# Patient Record
Sex: FEMALE | Race: WHITE | Employment: FULL TIME | URBAN - METROPOLITAN AREA
[De-identification: names, ages, dates, MRNs, and addresses within clinical notes are randomized per-mention and may not be internally consistent; named-entity substitution may affect disease eponyms.]

---

## 2020-06-18 ENCOUNTER — HOSPITAL ENCOUNTER (OUTPATIENT)
Age: 30
Discharge: HOME | End: 2020-06-18
Payer: COMMERCIAL

## 2020-06-18 LAB — HCG QUANTITATIVE: 336 IU/L

## 2020-06-18 PROCEDURE — 84702 CHORIONIC GONADOTROPIN TEST: CPT

## 2020-06-18 PROCEDURE — 36415 COLL VENOUS BLD VENIPUNCTURE: CPT

## 2020-06-20 ENCOUNTER — HOSPITAL ENCOUNTER (OUTPATIENT)
Age: 30
Discharge: HOME | End: 2020-06-20
Payer: COMMERCIAL

## 2020-06-20 LAB — HCG QUANTITATIVE: 818 IU/L

## 2020-06-20 PROCEDURE — 84702 CHORIONIC GONADOTROPIN TEST: CPT

## 2020-06-20 PROCEDURE — 36415 COLL VENOUS BLD VENIPUNCTURE: CPT

## 2020-06-24 ENCOUNTER — HOSPITAL ENCOUNTER (OUTPATIENT)
Age: 30
Discharge: HOME | End: 2020-06-24
Payer: COMMERCIAL

## 2020-06-24 LAB — HCG QUANTITATIVE: 1987 IU/L

## 2020-06-24 PROCEDURE — 36415 COLL VENOUS BLD VENIPUNCTURE: CPT

## 2020-06-24 PROCEDURE — 84702 CHORIONIC GONADOTROPIN TEST: CPT

## 2020-10-24 ENCOUNTER — HOSPITAL ENCOUNTER (OUTPATIENT)
Dept: PREADMISSION TESTING | Age: 30
Setting detail: SPECIMEN
Discharge: HOME | End: 2020-10-28
Payer: COMMERCIAL

## 2020-10-24 PROCEDURE — U0003 INFECTIOUS AGENT DETECTION BY NUCLEIC ACID (DNA OR RNA); SEVERE ACUTE RESPIRATORY SYNDROME CORONAVIRUS 2 (SARS-COV-2) (CORONAVIRUS DISEASE [COVID-19]), AMPLIFIED PROBE TECHNIQUE, MAKING USE OF HIGH THROUGHPUT TECHNOLOGIES AS DESCRIBED BY CMS-2020-01-R: HCPCS

## 2020-10-25 LAB — SARS-COV-2, NAA: NOT DETECTED

## 2020-10-27 ENCOUNTER — ANESTHESIA EVENT (OUTPATIENT)
Dept: OPERATING ROOM | Age: 30
End: 2020-10-27
Payer: COMMERCIAL

## 2020-10-28 ENCOUNTER — HOSPITAL ENCOUNTER (OUTPATIENT)
Age: 30
Setting detail: OUTPATIENT SURGERY
Discharge: HOME | End: 2020-10-28
Attending: SURGERY | Admitting: SURGERY
Payer: COMMERCIAL

## 2020-10-28 ENCOUNTER — ANESTHESIA (OUTPATIENT)
Dept: OPERATING ROOM | Age: 30
End: 2020-10-28
Payer: COMMERCIAL

## 2020-10-28 VITALS
DIASTOLIC BLOOD PRESSURE: 89 MMHG | TEMPERATURE: 96.8 F | HEIGHT: 62 IN | RESPIRATION RATE: 16 BRPM | OXYGEN SATURATION: 99 % | WEIGHT: 123.75 LBS | BODY MASS INDEX: 22.77 KG/M2 | SYSTOLIC BLOOD PRESSURE: 125 MMHG | HEART RATE: 73 BPM

## 2020-10-28 VITALS — DIASTOLIC BLOOD PRESSURE: 61 MMHG | SYSTOLIC BLOOD PRESSURE: 104 MMHG | OXYGEN SATURATION: 100 %

## 2020-10-28 LAB
CASE NUMBER:: NORMAL
HCG, PREGNANCY URINE (POC): NEGATIVE
SPECIMEN DESCRIPTION: NORMAL

## 2020-10-28 PROCEDURE — 2580000003 HC RX 258: Performed by: SURGERY

## 2020-10-28 PROCEDURE — 7100000000 HC PACU RECOVERY - FIRST 15 MIN: Performed by: SURGERY

## 2020-10-28 PROCEDURE — 3600000012 HC SURGERY LEVEL 2 ADDTL 15MIN: Performed by: SURGERY

## 2020-10-28 PROCEDURE — 6370000000 HC RX 637 (ALT 250 FOR IP): Performed by: ANESTHESIOLOGY

## 2020-10-28 PROCEDURE — 3700000000 HC ANESTHESIA ATTENDED CARE: Performed by: SURGERY

## 2020-10-28 PROCEDURE — 2500000003 HC RX 250 WO HCPCS: Performed by: NURSE ANESTHETIST, CERTIFIED REGISTERED

## 2020-10-28 PROCEDURE — 2580000003 HC RX 258: Performed by: ANESTHESIOLOGY

## 2020-10-28 PROCEDURE — 2709999900 HC NON-CHARGEABLE SUPPLY: Performed by: SURGERY

## 2020-10-28 PROCEDURE — 7100000011 HC PHASE II RECOVERY - ADDTL 15 MIN: Performed by: SURGERY

## 2020-10-28 PROCEDURE — 81025 URINE PREGNANCY TEST: CPT

## 2020-10-28 PROCEDURE — 88305 TISSUE EXAM BY PATHOLOGIST: CPT

## 2020-10-28 PROCEDURE — 88112 CYTOPATH CELL ENHANCE TECH: CPT

## 2020-10-28 PROCEDURE — 6360000002 HC RX W HCPCS: Performed by: NURSE ANESTHETIST, CERTIFIED REGISTERED

## 2020-10-28 PROCEDURE — 6360000002 HC RX W HCPCS: Performed by: SURGERY

## 2020-10-28 PROCEDURE — 7100000001 HC PACU RECOVERY - ADDTL 15 MIN: Performed by: SURGERY

## 2020-10-28 PROCEDURE — 3600000002 HC SURGERY LEVEL 2 BASE: Performed by: SURGERY

## 2020-10-28 PROCEDURE — 7100000010 HC PHASE II RECOVERY - FIRST 15 MIN: Performed by: SURGERY

## 2020-10-28 PROCEDURE — 3700000001 HC ADD 15 MINUTES (ANESTHESIA): Performed by: SURGERY

## 2020-10-28 PROCEDURE — 2500000003 HC RX 250 WO HCPCS: Performed by: SURGERY

## 2020-10-28 RX ORDER — FENTANYL CITRATE 50 UG/ML
25 INJECTION, SOLUTION INTRAMUSCULAR; INTRAVENOUS EVERY 5 MIN PRN
Status: DISCONTINUED | OUTPATIENT
Start: 2020-10-28 | End: 2020-10-28 | Stop reason: HOSPADM

## 2020-10-28 RX ORDER — SUMATRIPTAN 50 MG/1
50 TABLET, FILM COATED ORAL
COMMUNITY

## 2020-10-28 RX ORDER — ONDANSETRON 2 MG/ML
4 INJECTION INTRAMUSCULAR; INTRAVENOUS
Status: DISCONTINUED | OUTPATIENT
Start: 2020-10-28 | End: 2020-10-28 | Stop reason: HOSPADM

## 2020-10-28 RX ORDER — MIDAZOLAM HYDROCHLORIDE 1 MG/ML
INJECTION INTRAMUSCULAR; INTRAVENOUS PRN
Status: DISCONTINUED | OUTPATIENT
Start: 2020-10-28 | End: 2020-10-28 | Stop reason: SDUPTHER

## 2020-10-28 RX ORDER — SODIUM CHLORIDE 0.9 % (FLUSH) 0.9 %
10 SYRINGE (ML) INJECTION EVERY 12 HOURS SCHEDULED
Status: DISCONTINUED | OUTPATIENT
Start: 2020-10-28 | End: 2020-10-28 | Stop reason: HOSPADM

## 2020-10-28 RX ORDER — LIDOCAINE HYDROCHLORIDE 10 MG/ML
1 INJECTION, SOLUTION EPIDURAL; INFILTRATION; INTRACAUDAL; PERINEURAL
Status: DISCONTINUED | OUTPATIENT
Start: 2020-10-28 | End: 2020-10-28 | Stop reason: HOSPADM

## 2020-10-28 RX ORDER — FENTANYL CITRATE 50 UG/ML
50 INJECTION, SOLUTION INTRAMUSCULAR; INTRAVENOUS EVERY 5 MIN PRN
Status: DISCONTINUED | OUTPATIENT
Start: 2020-10-28 | End: 2020-10-28 | Stop reason: HOSPADM

## 2020-10-28 RX ORDER — ONDANSETRON 2 MG/ML
INJECTION INTRAMUSCULAR; INTRAVENOUS PRN
Status: DISCONTINUED | OUTPATIENT
Start: 2020-10-28 | End: 2020-10-28 | Stop reason: SDUPTHER

## 2020-10-28 RX ORDER — SODIUM CHLORIDE 9 MG/ML
INJECTION, SOLUTION INTRAVENOUS CONTINUOUS
Status: DISCONTINUED | OUTPATIENT
Start: 2020-10-29 | End: 2020-10-28

## 2020-10-28 RX ORDER — OXYCODONE HYDROCHLORIDE AND ACETAMINOPHEN 5; 325 MG/1; MG/1
1 TABLET ORAL
Status: COMPLETED | OUTPATIENT
Start: 2020-10-28 | End: 2020-10-28

## 2020-10-28 RX ORDER — DEXAMETHASONE SODIUM PHOSPHATE 10 MG/ML
INJECTION INTRAMUSCULAR; INTRAVENOUS PRN
Status: DISCONTINUED | OUTPATIENT
Start: 2020-10-28 | End: 2020-10-28 | Stop reason: SDUPTHER

## 2020-10-28 RX ORDER — FENTANYL CITRATE 50 UG/ML
INJECTION, SOLUTION INTRAMUSCULAR; INTRAVENOUS PRN
Status: DISCONTINUED | OUTPATIENT
Start: 2020-10-28 | End: 2020-10-28 | Stop reason: SDUPTHER

## 2020-10-28 RX ORDER — LIDOCAINE HYDROCHLORIDE 20 MG/ML
INJECTION, SOLUTION EPIDURAL; INFILTRATION; INTRACAUDAL; PERINEURAL PRN
Status: DISCONTINUED | OUTPATIENT
Start: 2020-10-28 | End: 2020-10-28 | Stop reason: SDUPTHER

## 2020-10-28 RX ORDER — DEXTROAMPHETAMINE SACCHARATE, AMPHETAMINE ASPARTATE MONOHYDRATE, DEXTROAMPHETAMINE SULFATE AND AMPHETAMINE SULFATE 3.75; 3.75; 3.75; 3.75 MG/1; MG/1; MG/1; MG/1
15 CAPSULE, EXTENDED RELEASE ORAL 2 TIMES DAILY
COMMUNITY

## 2020-10-28 RX ORDER — SODIUM CHLORIDE 0.9 % (FLUSH) 0.9 %
10 SYRINGE (ML) INJECTION PRN
Status: DISCONTINUED | OUTPATIENT
Start: 2020-10-28 | End: 2020-10-28 | Stop reason: HOSPADM

## 2020-10-28 RX ORDER — CEFAZOLIN SODIUM 1 G/3ML
INJECTION, POWDER, FOR SOLUTION INTRAMUSCULAR; INTRAVENOUS PRN
Status: DISCONTINUED | OUTPATIENT
Start: 2020-10-28 | End: 2020-10-28 | Stop reason: SDUPTHER

## 2020-10-28 RX ORDER — PROPOFOL 10 MG/ML
INJECTION, EMULSION INTRAVENOUS PRN
Status: DISCONTINUED | OUTPATIENT
Start: 2020-10-28 | End: 2020-10-28 | Stop reason: SDUPTHER

## 2020-10-28 RX ORDER — HYDROCODONE BITARTRATE AND ACETAMINOPHEN 5; 325 MG/1; MG/1
1 TABLET ORAL EVERY 4 HOURS PRN
Qty: 18 TABLET | Refills: 0 | Status: SHIPPED | OUTPATIENT
Start: 2020-10-28 | End: 2020-10-31

## 2020-10-28 RX ORDER — SODIUM CHLORIDE, SODIUM LACTATE, POTASSIUM CHLORIDE, CALCIUM CHLORIDE 600; 310; 30; 20 MG/100ML; MG/100ML; MG/100ML; MG/100ML
INJECTION, SOLUTION INTRAVENOUS CONTINUOUS
Status: DISCONTINUED | OUTPATIENT
Start: 2020-10-28 | End: 2020-10-28 | Stop reason: HOSPADM

## 2020-10-28 RX ADMIN — ONDANSETRON 4 MG: 2 INJECTION, SOLUTION INTRAMUSCULAR; INTRAVENOUS at 10:50

## 2020-10-28 RX ADMIN — LIDOCAINE HYDROCHLORIDE 100 MG: 20 INJECTION, SOLUTION EPIDURAL; INFILTRATION; INTRACAUDAL; PERINEURAL at 10:33

## 2020-10-28 RX ADMIN — OXYCODONE HYDROCHLORIDE AND ACETAMINOPHEN 1 TABLET: 5; 325 TABLET ORAL at 12:20

## 2020-10-28 RX ADMIN — Medication 100 MCG: at 10:33

## 2020-10-28 RX ADMIN — DEXAMETHASONE SODIUM PHOSPHATE 10 MG: 10 INJECTION INTRAMUSCULAR; INTRAVENOUS at 10:50

## 2020-10-28 RX ADMIN — PROPOFOL 200 MG: 10 INJECTION, EMULSION INTRAVENOUS at 10:33

## 2020-10-28 RX ADMIN — SODIUM CHLORIDE, POTASSIUM CHLORIDE, SODIUM LACTATE AND CALCIUM CHLORIDE: 600; 310; 30; 20 INJECTION, SOLUTION INTRAVENOUS at 10:30

## 2020-10-28 RX ADMIN — MIDAZOLAM 2 MG: 1 INJECTION INTRAMUSCULAR; INTRAVENOUS at 10:30

## 2020-10-28 RX ADMIN — CEFAZOLIN 2000 MG: 1 INJECTION, POWDER, FOR SOLUTION INTRAMUSCULAR; INTRAVENOUS at 10:45

## 2020-10-28 ASSESSMENT — PULMONARY FUNCTION TESTS
PIF_VALUE: 12
PIF_VALUE: 1
PIF_VALUE: 10
PIF_VALUE: 10
PIF_VALUE: 12
PIF_VALUE: 10
PIF_VALUE: 12
PIF_VALUE: 14
PIF_VALUE: 12
PIF_VALUE: 22
PIF_VALUE: 12
PIF_VALUE: 12
PIF_VALUE: 16
PIF_VALUE: 12
PIF_VALUE: 12
PIF_VALUE: 10
PIF_VALUE: 3
PIF_VALUE: 12
PIF_VALUE: 12
PIF_VALUE: 10
PIF_VALUE: 13
PIF_VALUE: 10
PIF_VALUE: 12
PIF_VALUE: 12
PIF_VALUE: 10
PIF_VALUE: 13
PIF_VALUE: 10
PIF_VALUE: 18
PIF_VALUE: 10
PIF_VALUE: 10
PIF_VALUE: 12
PIF_VALUE: 12
PIF_VALUE: 10
PIF_VALUE: 1
PIF_VALUE: 12
PIF_VALUE: 10
PIF_VALUE: 12
PIF_VALUE: 13
PIF_VALUE: 8
PIF_VALUE: 11
PIF_VALUE: 10
PIF_VALUE: 2
PIF_VALUE: 1
PIF_VALUE: 10
PIF_VALUE: 9
PIF_VALUE: 10
PIF_VALUE: 13
PIF_VALUE: 13
PIF_VALUE: 2
PIF_VALUE: 2
PIF_VALUE: 12
PIF_VALUE: 10
PIF_VALUE: 26
PIF_VALUE: 12
PIF_VALUE: 12
PIF_VALUE: 1
PIF_VALUE: 1
PIF_VALUE: 18
PIF_VALUE: 10
PIF_VALUE: 13
PIF_VALUE: 12
PIF_VALUE: 10
PIF_VALUE: 12
PIF_VALUE: 13
PIF_VALUE: 12
PIF_VALUE: 10
PIF_VALUE: 12

## 2020-10-28 ASSESSMENT — PAIN SCALES - GENERAL
PAINLEVEL_OUTOF10: 4
PAINLEVEL_OUTOF10: 5
PAINLEVEL_OUTOF10: 4
PAINLEVEL_OUTOF10: 4

## 2020-10-28 ASSESSMENT — PAIN DESCRIPTION - ONSET: ONSET: ON-GOING

## 2020-10-28 ASSESSMENT — PAIN DESCRIPTION - DESCRIPTORS: DESCRIPTORS: ACHING

## 2020-10-28 ASSESSMENT — PAIN DESCRIPTION - LOCATION: LOCATION: BREAST

## 2020-10-28 ASSESSMENT — PAIN DESCRIPTION - FREQUENCY: FREQUENCY: CONTINUOUS

## 2020-10-28 ASSESSMENT — PAIN DESCRIPTION - PAIN TYPE: TYPE: SURGICAL PAIN

## 2020-10-28 ASSESSMENT — PAIN DESCRIPTION - ORIENTATION: ORIENTATION: LEFT

## 2020-10-28 NOTE — ANESTHESIA POSTPROCEDURE EVALUATION
Department of Anesthesiology  Postprocedure Note    Patient: Genene Babinski  MRN: [id number]  [place]: 1990  Date of evaluation: 10/28/2020  Time:  1:42 PM     Procedure Summary     Date:  10/28/20 Room / Location:  [facility] OR 01 / [facility] - INPATIENT    Anesthesia Start:  1030 Anesthesia Stop:  4840    Procedure:  LEFT BREAST MASS WIDE EXCISION W ULTRASOUND BY  (Left Breast) Diagnosis:  (DX LEFT BREAST MASS)    Surgeon:  Wesley Poole MD Responsible Provider:  Yony Trent MD    Anesthesia Type:  general ASA Status:  2          Anesthesia Type: No value filed. Quincy Phase I: Quincy Score: 10    Quincy Phase II: Quincy Score: 10    Last vitals: Reviewed and per EMR flowsheets.        Anesthesia Post Evaluation    Complications: no

## 2020-10-28 NOTE — ANESTHESIA PRE PROCEDURE
tablet 1 tablet  1 tablet Oral Once PRN Joanie Pratt MD        ondansetron [facility]) injection 4 mg  4 mg Intravenous Once PRN Joanie Pratt MD           Allergies: Allergies   Allergen Reactions    Sulfa Antibiotics Nausea And Vomiting and Rash       Problem List:  There is no problem list on file for this patient. Past Medical History:        Diagnosis Date    ADHD     Ectopic pregnancy 2020    Migraines        Past Surgical History:        Procedure Laterality Date    BREAST LUMPECTOMY Right     ECTOPIC PREGNANCY SURGERY  2020    LYMPH NODE DISSECTION Left        Social History:    Social History     Tobacco Use    Smoking status: Current Every Day Smoker     Packs/day: 0.25    Smokeless tobacco: Never Used   Substance Use Topics    Alcohol use: Yes                                Ready to quit: Not Answered  Counseling given: Not Answered      Vital Signs (Current):   Vitals:    10/28/20 0817   BP: 117/74   Pulse: 79   Resp: 16   Temp: 97.2 °F (36.2 °C)   TempSrc: Temporal   SpO2: 99%   Weight: 123 lb 12 oz (56.1 kg)   Height: 5' 2\" (1.575 m)                                              BP Readings from Last 3 Encounters:   10/28/20 117/74       NPO Status: Time of last liquid consumption: 2200                        Time of last solid consumption: 2200                        Date of last liquid consumption: 10/27/20                        Date of last solid food consumption: 10/27/20    BMI:   Wt Readings from Last 3 Encounters:   10/28/20 123 lb 12 oz (56.1 kg)     Body mass index is 22.63 kg/m².     CBC:   Lab Results   Component Value Date    WBC 9.0 12/31/2013    RBC 4.40 12/31/2013    HGB 13.3 12/31/2013    HCT 40.8 12/31/2013    MCV 92.8 12/31/2013    RDW 12.1 12/31/2013     12/31/2013       CMP:   Lab Results   Component Value Date     12/31/2013    K 4.4 12/31/2013     12/31/2013    CO2 26 12/31/2013    BUN 9 12/31/2013    CREATININE 0.63 12/31/2013    GFRAA >60

## 2020-10-28 NOTE — OP NOTE
area that had a very large cyst, but there was no  other area that had some cut in the cavity. All the tissue excised and  was submitted to pathology in formalin. The patient generally tolerated  the procedure well. Hemostasis was achieved using electrocautery and  argon beam coagulation. The wound was closed in layers with 3-0 Vicryl  to subcutaneous tissue and 3-0 Monocryl in the subcuticular layer to  approximate the skin edges. A sterile dressing was then applied to the  breast.  The patient left the operating suite in satisfactory condition.   Blood loss was minimal.      Jey Aguilar    D: 10/28/2020 11:59:04       T: 10/28/2020 12:39:44     [email or web address]  Job#: [id number]     Doc#: [id number]    CC:  Dinora Loepz       _____

## 2020-10-28 NOTE — BRIEF OP NOTE
Brief Postoperative Note      Patient: Aniyah Ovalles  YOB: 1990  MRN: [id number]    Date of Procedure: 10/28/2020    Pre-Op Diagnosis: DX LEFT BREAST MASS    Post-Op Diagnosis: same; left breast cysts       Procedure(s):  LEFT BREAST MASS WIDE EXCISION W ULTRASOUND BY     Surgeon(s):  Tran Maynard MD    Assistant:  Surgical Assistant: Philip Lemus    Anesthesia: General    Estimated Blood Loss (mL): Minimal    Complications: None    Specimens:   ID Type Source Tests Collected by Time Destination   A : left breast mass Tissue Breast [place] [address], MD 10/28/2020 1115    B : cyst aspiration left breast Body Fluid Breast CYTOLOGY, NON-GYN Tran Maynard MD 10/28/2020 1132        Implants:  * No implants in log *      Drains: * No LDAs found *    Findings: complex cyst in left breast at 2:00 and macrocyst at 3:00    Electronically signed by Tran Maynard MD on 10/28/2020 at 11:50 AM

## 2020-10-29 LAB
SURGICAL PATHOLOGY REPORT: NORMAL
SURGICAL PATHOLOGY REPORT: NORMAL

## 2021-08-26 ENCOUNTER — OFFICE VISIT (OUTPATIENT)
Dept: OBGYN CLINIC | Age: 31
End: 2021-08-26
Payer: OTHER GOVERNMENT

## 2021-08-26 VITALS
WEIGHT: 120 LBS | HEIGHT: 62 IN | BODY MASS INDEX: 22.08 KG/M2 | SYSTOLIC BLOOD PRESSURE: 110 MMHG | DIASTOLIC BLOOD PRESSURE: 60 MMHG

## 2021-08-26 DIAGNOSIS — Z87.59 HISTORY OF ECTOPIC PREGNANCY: ICD-10-CM

## 2021-08-26 DIAGNOSIS — Z31.69 ENCOUNTER FOR PRECONCEPTION CONSULTATION: Primary | ICD-10-CM

## 2021-08-26 PROCEDURE — 99203 OFFICE O/P NEW LOW 30 MIN: CPT | Performed by: OBSTETRICS & GYNECOLOGY

## 2021-08-27 NOTE — PROGRESS NOTES
James Rojas  2021    YOB: 1990          The patient was seen today. She is here regarding preconception counseling . Her bowels are regular and she is voiding without difficulty. HPI:  Antonio Prieto is a 36 y.o. female      Menarche: 8  No history of STI or PID, no history of intrabdominal infection  Periods are not overly painful and they are very regular    History of ectopic pregnancy in  with R salpingectomy  She had HSG that showed open/patent L fallopian tube    She is using ovulation test strips and states that they are positive and she is tracking appropriately. She states her and  Erickson have been trying actively for 6 months. They have not completed a semen analysis but she has done clomid cycles x4 with another provider. States no testing of progesterone or labs. She states that she is up to date on pap. Has had ultrasound in last 1 month. No signs of septum, polyp, or fibroids on ultrasound      OB History    Para Term  AB Living   1 0 0 0 1 0   SAB TAB Ectopic Molar Multiple Live Births   0 0 1 0 0 0      # Outcome Date GA Lbr Slava/2nd Weight Sex Delivery Anes PTL Lv   1 Ectopic 2020               Past Medical History:   Diagnosis Date    ADHD     Ectopic pregnancy     Migraines        Past Surgical History:   Procedure Laterality Date    BREAST LUMPECTOMY Right     BREAST SURGERY Left 10/28/2020    LEFT BREAST MASS WIDE EXCISION W ULTRASOUND BY  performed by Juliana Govea MD at [address] [place] [address]      right tube removed    LYMPH NODE DISSECTION Left        History reviewed. No pertinent family history.     Social History     Socioeconomic History    Marital status: Single     Spouse name: Not on file    Number of children: Not on file    Years of education: Not on file    Highest education level: Not on file   Occupational History    Not on file   Tobacco Use    Smoking status: Current Every Day Smoker     Packs/day: 0.25    Smokeless tobacco: Never Used   Vaping Use    Vaping Use: Never used   Substance and Sexual Activity    Alcohol use: Yes    Drug use: Not Currently    Sexual activity: Yes     Partners: Male   Other Topics Concern    Not on file   Social History Narrative    Not on file     Social Determinants of Health     Financial Resource Strain:     Difficulty of Paying Living Expenses:    Food Insecurity:     Worried About Running Out of Food in the Last Year:     [address] in the Last Year:    Transportation Needs:     Lack of Transportation (Medical):  Lack of Transportation (Non-Medical):    Physical Activity:     Days of Exercise per Week:     Minutes of Exercise per Session:    Stress:     Feeling of Stress :    Social Connections:     Frequency of Communication with Friends and Family:     Frequency of Social Gatherings with Friends and Family:     Attends Taoism Services:     Active Member of Clubs or Organizations:     Attends Club or Organization Meetings:     Marital Status:    Intimate Partner Violence:     Fear of Current or Ex-Partner:     Emotionally Abused:     Physically Abused:     Sexually Abused:          MEDICATIONS:  Current Outpatient Medications   Medication Sig Dispense Refill    amphetamine-dextroamphetamine (ADDERALL XR) 15 MG extended release capsule Take 15 mg by mouth 2 times daily.  SUMAtriptan (IMITREX) 50 MG tablet Take 50 mg by mouth once as needed for Migraine       No current facility-administered medications for this visit. ALLERGIES:  Allergies as of 08/26/2021 - Fully Reviewed 08/26/2021   Allergen Reaction Noted    Sulfa antibiotics Nausea And Vomiting and Rash 10/28/2020         REVIEW OF SYSTEMS:       A minimum of an eleven point review of systems was completed. Review Of Systems (11 point):  Constitutional: No fever, chills or malaise;  No weight change or fatigue  Head and Eyes: No vision, Headache, Dizziness or trauma in last 12 months  ENT ROS: No hearing, Tinnitis, sinus or taste problems  Hematological and Lymphatic ROS:No Lymphoma, Von Willebrand's, Hemophillia or Bleeding History  Psych ROS: No Depression, Homicidal thoughts,suicidal thoughts, or anxiety  Breast ROS: No prior breast abnormalities or lumps  Respiratory ROS: No SOB, Pneumoniae,Cough, or Pulmonary Embolism History  Cardiovascular ROS: No Chest Pain with Exertion, Palpitations, Syncope, Edema, Arrhythmia  Gastrointestinal ROS: No Indigestion, Heartburn, Nausea, vomiting, Diarrhea, Constipation,or Bowel Changes; No Bloody Stools or melena  Genito-Urinary ROS: No Dysuria, Hematuria or Nocturia. No Urinary Incontinence or Vaginal Discharge  Musculoskeletal ROS: No Arthralgia, Arthritis,Gout,Osteoporosis or Rheumatism  Neurological ROS: No CVA, Migraines, Epilepsy, Seizure Hx, or Limb Weakness  Dermatological ROS: No Rash, Itching, Hives, Mole Changes or Cancer          Blood pressure 110/60, height 5' 2\" (1.575 m), weight 120 lb (54.4 kg), last menstrual period 08/06/2021. Abdomen: Soft non-tender; good bowel sounds. No guarding, rebound or rigidity. No CVA tenderness bilaterally. Extremities: No calf tenderness, DTR 2/4, and No edema bilaterally    Pelvic: Exam deferred. Diagnostics:  No results found. Lab Results:  Results for orders placed or performed during the hospital encounter of 10/24/20   Covid-19 Ambulatory   Result Value Ref Range    SARS-CoV-2, DENISE Not Detected Not Detected         Assessment:   Diagnosis Orders   1. Encounter for preconception consultation  Progesterone    TSH With Reflex Ft4   2. History of ectopic pregnancy  Progesterone    TSH With Reflex Ft4     There are no problems to display for this patient. PLAN:  Return in about 4 months (around 12/26/2021) for follow up preconception.   [place] day 12,14,16,18  [place]: coenzyme q10 and Fish oil  PNV and folic acid  Semen analysis for [facility]  Repeat Annual every 1 year  Cervical Cytology Evaluation begins at 26years old. If Negative Cytology, Follow-up screening per current guidelines. Return to the office in 3-4 months. Counseled on preventative health maintenance follow-up. Orders Placed This Encounter   Procedures    Progesterone     Standing Status:   Future     Standing Expiration Date:   8/26/2022    TSH With Reflex Ft4     Standing Status:   Future     Standing Expiration Date:   8/26/2022           The patient, Gary Wiley is a 36 y.o. female, was seen with a total time spent of 30 minutes for the visit on this date of service by the E/M provider. The time component had both face to face and non face to face time spent in determining the total time component. Counseling and education regarding her diagnosis listed below and her options regarding those diagnoses were also included in determining her time component. Diagnosis Orders   1. Encounter for preconception consultation  Progesterone    TSH With Reflex Ft4   2. History of ectopic pregnancy  Progesterone    TSH With Reflex Ft4        The patient had her preventative health maintenance recommendations and follow-up reviewed with her at the completion of her visit.

## 2021-09-24 ENCOUNTER — HOSPITAL ENCOUNTER (OUTPATIENT)
Age: 31
Discharge: HOME | End: 2021-09-24
Payer: OTHER GOVERNMENT

## 2021-09-24 DIAGNOSIS — Z31.69 ENCOUNTER FOR PRECONCEPTION CONSULTATION: ICD-10-CM

## 2021-09-24 DIAGNOSIS — Z87.59 HISTORY OF ECTOPIC PREGNANCY: ICD-10-CM

## 2021-09-24 LAB
PROGESTERONE LEVEL: 11.09 NG/ML
TSH SERPL DL<=0.05 MIU/L-ACNC: 3.49 MIU/L (ref 0.3–5)

## 2021-09-24 PROCEDURE — 36415 COLL VENOUS BLD VENIPUNCTURE: CPT

## 2021-09-24 PROCEDURE — 84144 ASSAY OF PROGESTERONE: CPT

## 2021-09-24 PROCEDURE — 84443 ASSAY THYROID STIM HORMONE: CPT

## 2021-09-27 ENCOUNTER — TELEPHONE (OUTPATIENT)
Dept: OBGYN CLINIC | Age: 31
End: 2021-09-27

## 2021-09-27 DIAGNOSIS — R79.89 ELEVATED TSH: Primary | ICD-10-CM

## 2021-09-30 RX ORDER — LEVOTHYROXINE SODIUM 0.05 MG/1
50 TABLET ORAL DAILY
Qty: 30 TABLET | Refills: 3 | Status: SHIPPED | OUTPATIENT
Start: 2021-09-30

## 2021-10-04 ENCOUNTER — HOSPITAL ENCOUNTER (OUTPATIENT)
Age: 31
Discharge: HOME | End: 2021-10-04
Payer: OTHER GOVERNMENT

## 2021-10-04 DIAGNOSIS — O09.10 PREGNANCY WITH HISTORY OF ECTOPIC PREGNANCY, ANTEPARTUM: Primary | ICD-10-CM

## 2021-10-04 DIAGNOSIS — O09.10 PREGNANCY WITH HISTORY OF ECTOPIC PREGNANCY, ANTEPARTUM: ICD-10-CM

## 2021-10-04 LAB — HCG QUANTITATIVE: 152 IU/L

## 2021-10-04 PROCEDURE — 36415 COLL VENOUS BLD VENIPUNCTURE: CPT

## 2021-10-04 PROCEDURE — 84702 CHORIONIC GONADOTROPIN TEST: CPT

## 2021-10-05 ENCOUNTER — PATIENT MESSAGE (OUTPATIENT)
Dept: OBGYN CLINIC | Age: 31
End: 2021-10-05

## 2021-10-05 DIAGNOSIS — Z34.90 EARLY STAGE OF PREGNANCY: Primary | ICD-10-CM

## 2021-10-05 NOTE — TELEPHONE ENCOUNTER
From: Robinson Coronado  To: Ricardo Ruiz DO  Sent: 10/5/2021 7:48 AM EDT  Subject: Test Results Question    I was wondering if my HCG was normal? Or what I needed to do-if anything. I have not had any one sided pain so far. Thank you!

## 2021-10-05 NOTE — TELEPHONE ENCOUNTER
Yes just repeat as discussed in 3 days and recommend continue to trend given hx of ectopic and can plan to have her come in once over 2000 to verify IUP. If continue to elevate appropriate get set up for IPV. Continue to monitor ectopic precautions.

## 2021-10-07 ENCOUNTER — HOSPITAL ENCOUNTER (OUTPATIENT)
Age: 31
Discharge: HOME | End: 2021-10-07
Payer: OTHER GOVERNMENT

## 2021-10-07 DIAGNOSIS — Z34.90 EARLY STAGE OF PREGNANCY: ICD-10-CM

## 2021-10-07 LAB — HCG QUANTITATIVE: 967 IU/L

## 2021-10-07 PROCEDURE — 36415 COLL VENOUS BLD VENIPUNCTURE: CPT

## 2021-10-07 PROCEDURE — 84702 CHORIONIC GONADOTROPIN TEST: CPT

## 2021-10-09 ENCOUNTER — HOSPITAL ENCOUNTER (EMERGENCY)
Age: 31
Discharge: HOME | End: 2021-10-09
Attending: EMERGENCY MEDICINE
Payer: OTHER GOVERNMENT

## 2021-10-09 ENCOUNTER — APPOINTMENT (OUTPATIENT)
Dept: ULTRASOUND IMAGING | Age: 31
End: 2021-10-09
Payer: OTHER GOVERNMENT

## 2021-10-09 VITALS
HEART RATE: 87 BPM | SYSTOLIC BLOOD PRESSURE: 114 MMHG | RESPIRATION RATE: 18 BRPM | DIASTOLIC BLOOD PRESSURE: 75 MMHG | OXYGEN SATURATION: 99 % | TEMPERATURE: 98.1 F

## 2021-10-09 DIAGNOSIS — O20.0 THREATENED ABORTION, ANTEPARTUM: ICD-10-CM

## 2021-10-09 DIAGNOSIS — O20.9 VAGINAL BLEEDING BEFORE 22 WEEKS GESTATION: Primary | ICD-10-CM

## 2021-10-09 PROBLEM — O36.80X0 PREGNANCY OF UNKNOWN ANATOMIC LOCATION: Status: ACTIVE | Noted: 2021-10-09

## 2021-10-09 PROBLEM — Z87.59 HISTORY OF ECTOPIC PREGNANCY: Status: ACTIVE | Noted: 2021-10-09

## 2021-10-09 LAB
-: NORMAL
ABSOLUTE EOS #: 0.23 K/UL (ref 0–0.44)
ABSOLUTE IMMATURE GRANULOCYTE: <0.03 K/UL (ref 0–0.3)
ABSOLUTE LYMPH #: 2.81 K/UL (ref 1.1–3.7)
ABSOLUTE MONO #: 0.63 K/UL (ref 0.1–1.2)
AMORPHOUS: NORMAL
ANION GAP SERPL CALCULATED.3IONS-SCNC: 12 MMOL/L (ref 9–17)
BACTERIA: NORMAL
BASOPHILS # BLD: 1 % (ref 0–2)
BASOPHILS ABSOLUTE: 0.05 K/UL (ref 0–0.2)
BILIRUBIN URINE: NEGATIVE
BUN BLDV-MCNC: 6 MG/DL (ref 6–20)
BUN/CREAT BLD: ABNORMAL (ref 9–20)
CALCIUM SERPL-MCNC: 9.2 MG/DL (ref 8.6–10.4)
CASTS UA: NORMAL /LPF (ref 0–8)
CHLORIDE BLD-SCNC: 103 MMOL/L (ref 98–107)
CO2: 19 MMOL/L (ref 20–31)
COLOR: YELLOW
COMMENT UA: ABNORMAL
CREAT SERPL-MCNC: 0.46 MG/DL (ref 0.5–0.9)
CRYSTALS, UA: NORMAL /HPF
DIFFERENTIAL TYPE: NORMAL
EOSINOPHILS RELATIVE PERCENT: 3 % (ref 1–4)
EPITHELIAL CELLS UA: NORMAL /HPF (ref 0–5)
GFR AFRICAN AMERICAN: >60 ML/MIN
GFR NON-AFRICAN AMERICAN: >60 ML/MIN
GFR SERPL CREATININE-BSD FRML MDRD: ABNORMAL ML/MIN/{1.73_M2}
GFR SERPL CREATININE-BSD FRML MDRD: ABNORMAL ML/MIN/{1.73_M2}
GLUCOSE BLD-MCNC: 90 MG/DL (ref 70–99)
GLUCOSE URINE: NEGATIVE
HCG QUANTITATIVE: 2135 IU/L
HCT VFR BLD CALC: 39.2 % (ref 36.3–47.1)
HEMOGLOBIN: 12.6 G/DL (ref 11.9–15.1)
IMMATURE GRANULOCYTES: 0 %
KETONES, URINE: NEGATIVE
LEUKOCYTE ESTERASE, URINE: NEGATIVE
LYMPHOCYTES # BLD: 38 % (ref 24–43)
MCH RBC QN AUTO: 29.8 PG (ref 25.2–33.5)
MCHC RBC AUTO-ENTMCNC: 32.1 G/DL (ref 28.4–34.8)
MCV RBC AUTO: 92.7 FL (ref 82.6–102.9)
MONOCYTES # BLD: 9 % (ref 3–12)
MUCUS: NORMAL
NITRITE, URINE: NEGATIVE
NRBC AUTOMATED: 0 PER 100 WBC
OTHER OBSERVATIONS UA: NORMAL
PDW BLD-RTO: 12.2 % (ref 11.8–14.4)
PH UA: 8 (ref 5–8)
PLATELET # BLD: 273 K/UL (ref 138–453)
PLATELET ESTIMATE: NORMAL
PMV BLD AUTO: 10.4 FL (ref 8.1–13.5)
POTASSIUM SERPL-SCNC: 4.1 MMOL/L (ref 3.7–5.3)
PROTEIN UA: NEGATIVE
RBC # BLD: 4.23 M/UL (ref 3.95–5.11)
RBC # BLD: NORMAL 10*6/UL
RBC UA: NORMAL /HPF (ref 0–4)
RENAL EPITHELIAL, UA: NORMAL /HPF
SEG NEUTROPHILS: 49 % (ref 36–65)
SEGMENTED NEUTROPHILS ABSOLUTE COUNT: 3.69 K/UL (ref 1.5–8.1)
SODIUM BLD-SCNC: 134 MMOL/L (ref 135–144)
SPECIFIC GRAVITY UA: 1.01 (ref 1–1.03)
TRICHOMONAS: NORMAL
TURBIDITY: CLEAR
URINE HGB: ABNORMAL
UROBILINOGEN, URINE: NORMAL
WBC # BLD: 7.4 K/UL (ref 3.5–11.3)
WBC # BLD: NORMAL 10*3/UL
WBC UA: NORMAL /HPF (ref 0–5)
YEAST: NORMAL

## 2021-10-09 PROCEDURE — 85025 COMPLETE CBC W/AUTO DIFF WBC: CPT

## 2021-10-09 PROCEDURE — 99283 EMERGENCY DEPT VISIT LOW MDM: CPT

## 2021-10-09 PROCEDURE — 86900 BLOOD TYPING SEROLOGIC ABO: CPT

## 2021-10-09 PROCEDURE — 81001 URINALYSIS AUTO W/SCOPE: CPT

## 2021-10-09 PROCEDURE — 76817 TRANSVAGINAL US OBSTETRIC: CPT

## 2021-10-09 PROCEDURE — 80048 BASIC METABOLIC PNL TOTAL CA: CPT

## 2021-10-09 PROCEDURE — 84702 CHORIONIC GONADOTROPIN TEST: CPT

## 2021-10-09 PROCEDURE — 86901 BLOOD TYPING SEROLOGIC RH(D): CPT

## 2021-10-09 PROCEDURE — 86850 RBC ANTIBODY SCREEN: CPT

## 2021-10-09 ASSESSMENT — ENCOUNTER SYMPTOMS
ABDOMINAL PAIN: 0
COUGH: 0
VOMITING: 0
BACK PAIN: 0
SHORTNESS OF BREATH: 0
NAUSEA: 0

## 2021-10-09 NOTE — CONSULTS
[address] [facility]    Patient Name: Renetta Padilla     Patient : 1990  Room/Bed:   Admission Date/Time: 10/9/2021  2:15 PM  Primary Care Physician: Lorenzo Palmer MD    Consulting Provider: Dr. Fabyb Xiao  Reason for Consult: Vaginal Bleeding    CC:   Chief Complaint   Patient presents with    Vaginal Bleeding     ectopic pregnancy 5 weeks                HPI: Renetta Padilla is a 36 y.o. female Marley Nance presents from home with vaginal bleeding. She estimates that she is about 5 weeks pregnant but has not had a formal ultrasound at this time. She has a history of a ruptured ectopic pregnancy about 1 year ago. She states that she started to have bleeding and mild cramping about 24 hours ago. Pelvic Exam performed by the ED showed a slow trickle of bleeding from the cervical os. TVUS suggestive of miscarriage. No LMP recorded.      REVIEW OF SYSTEMS:   Constitutional: negative fever, negative chills, negative weight changes   HEENT: negative visual disturbances, negative headaches, negative dizziness, negative hearing loss  Breast: Negative breast abnormalities, negative breast lumps, negative nipple discharge  Respiratory: negative dyspnea, negative cough, negative SOB  Cardiovascular: negative chest pain,  negative palpitations, negative arrhythmia, negative syncope   Gastrointestinal: negative abdominal pain, negative RUQ pain, negative N/V, negative diarrhea, negative constipation, negative bowel changes, negative heartburn   Genitourinary: negative dysuria, negative hematuria, negative urinary incontinence, negative vaginal discharge, positive vaginal bleeding or spotting  Dermatological: negative rash, negative pruritis, negative mole or other skin changes  Hematologic: negative bruising  Immunologic/Lymphatic: negative recent illness, negative recent sick contact  Musculoskeletal: negative back pain, negative myalgias, negative arthralgias  Neurological:  negative dizziness, negative migraines, negative seizures, negative weakness  Behavior/Psych: negative depression, negative anxiety, negative SI, negative HI    ____________________________________________________________    OBSTETRICAL HISTORY:   OB History    Para Term  AB Living   1 0 0 0 1 0   SAB TAB Ectopic Molar Multiple Live Births   0 0 1 0 0 0      # Outcome Date GA Lbr Slava/2nd Weight Sex Delivery Anes PTL Lv   1 Ectopic 2020              Birth Comments: Right salpingectomy       PAST MEDICAL HISTORY:   has a past medical history of ADHD, Ectopic pregnancy, and Migraines. PAST SURGICAL HISTORY:   has a past surgical history that includes lymph node dissection (Left); Ectopic pregnancy surgery (2020); Breast lumpectomy (Right); and Breast surgery (Left, 10/28/2020). ALLERGIES:  Allergies as of 10/09/2021 - Fully Reviewed 10/09/2021   Allergen Reaction Noted    Sulfa antibiotics Nausea And Vomiting and Rash 10/28/2020       MEDICATIONS:  No current facility-administered medications for this encounter. Current Outpatient Medications   Medication Sig Dispense Refill    levothyroxine (SYNTHROID) 50 MCG tablet Take 1 tablet by mouth Daily 30 tablet 3    amphetamine-dextroamphetamine (ADDERALL XR) 15 MG extended release capsule Take 15 mg by mouth 2 times daily.  SUMAtriptan (IMITREX) 50 MG tablet Take 50 mg by mouth once as needed for Migraine         FAMILY HISTORY:  Family History of Breast, Ovarian, Colon or Uterine Cancer: No   family history is not on file. SOCIAL HISTORY:   reports that she has been smoking. She has been smoking about 0.25 packs per day. She has never used smokeless tobacco. She reports current alcohol use.  She reports previous drug use.  ________________________________________________________________________                                    Leni Backers:  Vitals:    10/09/21 1649   BP: 114/75   Pulse: 87   Resp: 18   Temp: 98.1 °F (36.7 °C)   TempSrc: Oral SpO2: 99%                                              INPUT/OUTPUT:  No intake/output data recorded. No intake/output data recorded. PHYSICAL EXAM:     General Appearance: Appears healthy. Alert; in no acute distress. Pleasant. Skin: Skin color, texture, turgor normal. No rashes or lesions. Lymphatic: No abnormally enlarged lymph nodes. Neck and EENT: normal atraumatic, no neck masses, normal thyroid, no jvd  Respiratory: Normal expansion. Clear to auscultation. No rales, rhonchi, or wheezing.   Cardiovascular: normal, regular rate and rhythm, no murmurs, rubs, or gallops  Breast: deferred  Abdomen: soft, non-tender, non-distended, no right upper quadrant tenderness and no CVA tenderness, no rebound, guarding, or rigidity, no abdominal scars  Pelvic Exam: deferred  Rectal Exam: deferred  Musculoskeletal: no gross abnormalities  Extremities: non-tender BLE and non-edematous  Psych:  oriented to time, place and person     LAB RESULTS:  Results for orders placed or performed during the hospital encounter of 10/09/21   CBC Auto Differential   Result Value Ref Range    WBC 7.4 3.5 - 11.3 k/uL    RBC 4.23 3.95 - 5.11 m/uL    Hemoglobin 12.6 11.9 - 15.1 g/dL    Hematocrit 39.2 36.3 - 47.1 %    MCV 92.7 82.6 - 102.9 fL    MCH 29.8 25.2 - 33.5 pg    MCHC 32.1 28.4 - 34.8 g/dL    RDW 12.2 11.8 - 14.4 %    Platelets 812 795 - 255 k/uL    MPV 10.4 8.1 - 13.5 fL    NRBC Automated 0.0 0.0 per 100 WBC    Differential Type NOT REPORTED     Seg Neutrophils 49 36 - 65 %    Lymphocytes 38 24 - 43 %    Monocytes 9 3 - 12 %    Eosinophils % 3 1 - 4 %    Basophils 1 0 - 2 %    Immature Granulocytes 0 0 %    Segs Absolute 3.69 1.50 - 8.10 k/uL    Absolute Lymph # 2.81 1.10 - 3.70 k/uL    Absolute Mono # 0.63 0.10 - 1.20 k/uL    Absolute Eos # 0.23 0.00 - 0.44 k/uL    Basophils Absolute 0.05 0.00 - 0.20 k/uL    Absolute Immature Granulocyte <0.03 0.00 - 0.30 k/uL    WBC Morphology NOT REPORTED     RBC Morphology NOT REPORTED     Platelet Estimate NOT REPORTED          DIAGNOSTICS:  Narrative   EXAMINATION:   FIRST TRIMESTER OBSTETRIC ULTRASOUND       10/9/2021       TECHNIQUE:   Transvaginal first trimester obstetric pelvic ultrasound was performed with   color Doppler flow evaluation.       COMPARISON:   None       HISTORY:   ORDERING SYSTEM PROVIDED HISTORY: Hx of ruptured ectopic.  vaginal bleeding.   ~5w pregnant   TECHNOLOGIST PROVIDED HISTORY:   Hx of ruptured ectopic.  vaginal bleeding. ~5w pregnant       FINDINGS:   Uterus: The gravid uterus is unremarkable measuring 7.8 x 5.1 x 5.8 cm. However, heterogeneous fluid is present within the endometrial canal due to   hemorrhage.  The endometrial echo complex has a maximal thickness of 1.3 cm.       Gestational Sac(s):  Not visualized       Yolk Sac:  Not visualized       Fetal Pole:  Not visualized       Crown Rump Length:  Not measured       Fetal Heart Rate:  Not measured       Right ovary: 3.5 x 2.5 x 2.8 cm.  There are right ovarian follicles.       Left ovary: 3.3 x 1.7 x 1.5 cm.  There are left ovarian follicles.  In   addition, there is a 9 x 10 mm complex cyst favoring a corpus luteal cyst   rather than an ectopic pregnancy.       Free fluid: A trace amount of fluid is present in the cul de sac.           Impression   1. No evidence of an intrauterine gestation with heterogeneous fluid in the   endometrial canal likely due to hemorrhage.  Recommend serial beta HCG and   pelvic ultrasound in 1 week to confirm resolution.    2. 1.0 cm complex left ovarian lesion favoring a corpus luteal cyst rather   than an ectopic pregnancy.             ASSESSMENT & PLAN:    Camille Richmond is a 36 y.o. female  Vaginal bleeding   - Patient presents from home with vaginal bleeding and some cramping for the last 24 hours   - Pelvic Exam performed by ED resident showed slow trickle from the cervical os   - Quant 2135 more than doubled from 48 hours ago when Jacqueline Benoit was 967   - TVUS showed evidence of a miscarriage with heterogenous fluid in the endometrial canal and no signs of viable BHcg   - Abdominal exam is benign   - Patient is stable for discharge at this time with close outpatient follow up   - Will trend her BHCG outpatient to ensure that it goes to 0   - Patient given bleeding and pain precautions and advised that her bleeding and pain will likely increased    - Motrin/tylenol and heat recommended    - Patient will follow up with Dr. Sherif Hernandez discussed with Dr. Karina Jones, who is agreeable.      Attending's Name: Dr. Patience Rosario DO  Ob/Gyn Resident   [address]  10/9/2021, 3:16 PM

## 2021-10-09 NOTE — ED PROVIDER NOTES
[facility] [facility]  Emergency Department  Emergency Medicine Resident Sign-out     Care of Jake Wade was assumed from Dr. Bud Burk and is being seen for Vaginal Bleeding (ectopic pregnancy 5 weeks)  . The patient's initial evaluation and plan have been discussed with the prior provider who initially evaluated the patient. EMERGENCY DEPARTMENT COURSE / MEDICAL DECISION MAKING:       MEDICATIONS GIVEN:  No orders of the defined types were placed in this encounter. LABS / RADIOLOGY:     Labs Reviewed   BASIC METABOLIC PANEL W/ REFLEX TO MG FOR LOW K - Abnormal; Notable for the following components:       Result Value    CREATININE 0.46 (*)     Sodium 134 (*)     CO2 19 (*)     All other components within normal limits   URINE RT REFLEX TO CULTURE - Abnormal; Notable for the following components:    Urine Hgb MODERATE (*)     All other components within normal limits   HCG, QUANTITATIVE, PREGNANCY - Abnormal; Notable for the following components:    hCG Quant 2,135 (*)     All other components within normal limits   CBC WITH AUTO DIFFERENTIAL   MICROSCOPIC URINALYSIS   TYPE AND SCREEN       US OB TRANSVAGINAL    Result Date: 10/9/2021  EXAMINATION: FIRST TRIMESTER OBSTETRIC ULTRASOUND 10/9/2021 TECHNIQUE: Transvaginal first trimester obstetric pelvic ultrasound was performed with color Doppler flow evaluation. COMPARISON: None HISTORY: ORDERING SYSTEM PROVIDED HISTORY: Hx of ruptured ectopic.  vaginal bleeding. ~5w pregnant TECHNOLOGIST PROVIDED HISTORY: Hx of ruptured ectopic.  vaginal bleeding. ~5w pregnant FINDINGS: Uterus: The gravid uterus is unremarkable measuring 7.8 x 5.1 x 5.8 cm. However, heterogeneous fluid is present within the endometrial canal due to hemorrhage. The endometrial echo complex has a maximal thickness of 1.3 cm.  Gestational Sac(s):  Not visualized Yolk Sac:  Not visualized Fetal Pole:  Not visualized Crown Rump Length:  Not measured Fetal Heart Rate:  Not measured Right ovary: 3.5 x 2.5 x 2.8 cm. There are right ovarian follicles. Left ovary: 3.3 x 1.7 x 1.5 cm. There are left ovarian follicles. In addition, there is a 9 x 10 mm complex cyst favoring a corpus luteal cyst rather than an ectopic pregnancy. Free fluid: A trace amount of fluid is present in the cul de sac. 1. No evidence of an intrauterine gestation with heterogeneous fluid in the endometrial canal likely due to hemorrhage. Recommend serial beta HCG and pelvic ultrasound in 1 week to confirm resolution. 2. 1.0 cm complex left ovarian lesion favoring a corpus luteal cyst rather than an ectopic pregnancy. RECENT VITALS:     Temp: 98.1 °F (36.7 °C),  Pulse: 87, Resp: 18, BP: 114/75, SpO2: 99 %    This patient is a 36 y.o. Female with with a past medical history includes ectopic pregnancy approximately a year ago. Positive pregnancy tests. Having vaginal bleeding lower abdominal cramping for the past 24 hours. Sent in by [facility] team for evaluation and transvaginal ultrasound. hCG 2135. Vitals otherwise within normal limits. Stable hemoglobin. Transvaginal ultrasound demonstrating no evidence of intrauterine gestation with heterogeneous fluid in the endometrial canal likely due to hemorrhage. 1.0 cm complex left ovarian lesion favoring corpus luteal cyst rather than ectopic. After further discussion with OB team they do not feel this is an ectopic pregnancy and patient be safely be discharged home and follow-up with OB/GYN team as outpatient. Likely spontaneous miscarriage. Will need hCG trending to 0 as outpatient. Given strict return precautions by OB/GYN team.      OUTSTANDING TASKS / RECOMMENDATIONS:    1. Follow-up transvaginal ultrasound  2. Disposition     FINAL IMPRESSION:     1. Vaginal bleeding before 22 weeks gestation    2.  Threatened , antepartum        DISPOSITION:         DISPOSITION:  [x]  Discharge   []  Transfer -    []  Admission -     []  Against Medical Advice []  Eloped   FOLLOW-UP: Jennifer Vega MD  [address] [facility]  [phone]      As needed    Laura Romero, [facility] 270 [id number] [address]  [phone]    Schedule an appointment as soon as possible for a visit        DISCHARGE MEDICATIONS: New Prescriptions    No medications on file           Gayle Aden DO  Emergency Medicine Resident  [address] [facility]       Gayle Aden, [address] [place] [address]  Resident  10/09/21 8090

## 2021-10-09 NOTE — ED PROVIDER NOTES
[address] [facility]     Emergency Department     Faculty Attestation    I performed a history and physical examination of the patient and discussed management with the resident. I reviewed the residents note and agree with the documented findings and plan of care. Any areas of disagreement are noted on the chart. I was personally present for the key portions of any procedures. I have documented in the chart those procedures where I was not present during the key portions. I have reviewed the emergency nurses triage note. I agree with the chief complaint, past medical history, past surgical history, allergies, medications, social and family history as documented unless otherwise noted below. For Physician Assistant/ Nurse Practitioner cases/documentation I have personally evaluated this patient and have completed at least one if not all key elements of the E/M (history, physical exam, and MDM). Additional findings are as noted. I have personally seen and evaluated the patient. I find the patient's history and physical exam are consistent with the NP/PA documentation. I agree with the care provided, treatment rendered, disposition and follow-up plan. Vaginal bleeding approximately 5 weeks pregnant denies abdominal pain at the present time. Critical Care     Queenie Morales M.D. 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   She has been advised of these findings and she is asking adamantly to be released at this time while we are waiting to formally discuss this with OB/GYN          John Almanza MD  10/09/21 0837 Edmond Snow MD  10/09/21 [address], MD  10/09/21 4199

## 2021-10-09 NOTE — ED PROVIDER NOTES
[facility]  ED  Emergency Department Encounter  EmergencyMedicine Resident     Pt Jude Guillaume  MRN: [id number]  [place] 1990  Date of evaluation: 10/9/21  PCP:  Bob Gaines MD    [address] [place] [address]       Chief Complaint   Patient presents with    Vaginal Bleeding     ectopic pregnancy 5 weeks       HISTORY OF PRESENT ILLNESS  (Location/Symptom, Timing/Onset, Context/Setting, Quality, Duration, Modifying Factors, Severity.)      Clovis Huffman is a 36 y.o. female who presents with painless vaginal bleeding. Patient is approximately 5 weeks pregnant based off quantitative hCGs. Follows with Dr. Esme Cabezas. Patient has a history of ruptured ectopic pregnancy approximately 1 year ago. She states at that time she did not have any pain until her tube ruptured. She has not had a formal transvaginal ultrasound, she is following with Dr. Judie eugene and when she started having vaginal bleeding and cramping approximately 24 hours ago Dr. Esme Cabezas sent her here to rule out ectopic pregnancy. She reports that the bleeding is more than spotting but is not \"gushing\". History of ADHD is on Adderall but has not been taking it, also on migraine medication but has not been taking it during the pregnancy, currently only on Synthroid. Monogamous with . No STI concerns. PAST MEDICAL / SURGICAL / SOCIAL / FAMILY HISTORY      has a past medical history of ADHD, Ectopic pregnancy, and Migraines. has a past surgical history that includes lymph node dissection (Left); Ectopic pregnancy surgery (2020); Breast lumpectomy (Right); and Breast surgery (Left, 10/28/2020).     Social History     Socioeconomic History    Marital status: Single     Spouse name: Not on file    Number of children: Not on file    Years of education: Not on file    Highest education level: Not on file   Occupational History    Not on file   Tobacco Use    Smoking status: Current Every Day Smoker Packs/day: 0.25    Smokeless tobacco: Never Used   Vaping Use    Vaping Use: Never used   Substance and Sexual Activity    Alcohol use: Yes    Drug use: Not Currently    Sexual activity: Yes     Partners: Male   Other Topics Concern    Not on file   Social History Narrative    Not on file     Social Determinants of Health     Financial Resource Strain:     Difficulty of Paying Living Expenses:    Food Insecurity:     Worried About Running Out of Food in the Last Year:     [address] in the Last Year:    Transportation Needs:     Lack of Transportation (Medical):  Lack of Transportation (Non-Medical):    Physical Activity:     Days of Exercise per Week:     Minutes of Exercise per Session:    Stress:     Feeling of Stress :    Social Connections:     Frequency of Communication with Friends and Family:     Frequency of Social Gatherings with Friends and Family:     Attends Zoroastrian Services:     Active Member of Clubs or Organizations:     Attends Club or Organization Meetings:     Marital Status:    Intimate Partner Violence:     Fear of Current or Ex-Partner:     Emotionally Abused:     Physically Abused:     Sexually Abused:        No family history on file. Allergies:  Sulfa antibiotics    Home Medications:  Prior to Admission medications    Medication Sig Start Date End Date Taking? Authorizing Provider   levothyroxine (SYNTHROID) 50 MCG tablet Take 1 tablet by mouth Daily 9/30/21   Antonio Barreto DO   amphetamine-dextroamphetamine (ADDERALL XR) 15 MG extended release capsule Take 15 mg by mouth 2 times daily. Historical Provider, MD   SUMAtriptan (IMITREX) 50 MG tablet Take 50 mg by mouth once as needed for Migraine    Historical Provider, MD       REVIEW OF SYSTEMS    (2-9 systems for level 4, 10 or more for level 5)      Review of Systems   Constitutional: Negative for chills and fever. Respiratory: Negative for cough and shortness of breath.     Cardiovascular: Negative for chest pain. Gastrointestinal: Negative for abdominal pain, nausea and vomiting. Genitourinary: Positive for vaginal bleeding. Negative for dysuria, frequency, vaginal discharge and vaginal pain. Musculoskeletal: Negative for back pain. Neurological: Negative for headaches. PHYSICAL EXAM   (up to 7 for level 4, 8 or more for level 5)      INITIAL VITALS:   There were no vitals taken for this visit. There were no vitals filed for this visit. Physical Exam  Vitals reviewed. Constitutional:       General: She is not in acute distress. Appearance: She is well-developed. She is not ill-appearing. HENT:      Head: Normocephalic and atraumatic. Eyes:      Extraocular Movements: Extraocular movements intact. Pupils: Pupils are equal, round, and reactive to light. Cardiovascular:      Rate and Rhythm: Normal rate and regular rhythm. Pulmonary:      Effort: Pulmonary effort is normal.      Breath sounds: Normal breath sounds. Abdominal:      General: There is no distension. Palpations: Abdomen is soft. Tenderness: There is no abdominal tenderness. Genitourinary:     Comments: No vaginal discharge. Cervical os is closed. Small amount of bright red blood dripping from cervical os. Cervix is nontender. No cervical lesions. No obvious vaginal lesions. Musculoskeletal:         General: Normal range of motion. Cervical back: Normal range of motion and neck supple. Right lower leg: No edema. Left lower leg: No edema. Skin:     General: Skin is warm and dry. Neurological:      General: No focal deficit present. Mental Status: She is alert and oriented to person, place, and time.          DIFFERENTIAL  DIAGNOSIS     PLAN (LABS / IMAGING / EKG):  Orders Placed This Encounter   Procedures    US OB TRANSVAGINAL    CBC Auto Differential    Basic Metabolic Panel w/ Reflex to MG    Urinalysis Reflex to Culture    HCG, QUANTITATIVE, PREGNANCY    Microscopic Urinalysis    Vaginal exam    Inpatient consult to Obstetrics / Gynecology    TYPE AND SCREEN       MEDICATIONS ORDERED:  No orders of the defined types were placed in this encounter.       DIAGNOSTIC RESULTS / EMERGENCY DEPARTMENT COURSE / MDM   LAB RESULTS:  Results for orders placed or performed during the hospital encounter of 10/09/21   CBC Auto Differential   Result Value Ref Range    WBC 7.4 3.5 - 11.3 k/uL    RBC 4.23 3.95 - 5.11 m/uL    Hemoglobin 12.6 11.9 - 15.1 g/dL    Hematocrit 39.2 36.3 - 47.1 %    MCV 92.7 82.6 - 102.9 fL    MCH 29.8 25.2 - 33.5 pg    MCHC 32.1 28.4 - 34.8 g/dL    RDW 12.2 11.8 - 14.4 %    Platelets 086 382 - 955 k/uL    MPV 10.4 8.1 - 13.5 fL    NRBC Automated 0.0 0.0 per 100 WBC    Differential Type NOT REPORTED     Seg Neutrophils 49 36 - 65 %    Lymphocytes 38 24 - 43 %    Monocytes 9 3 - 12 %    Eosinophils % 3 1 - 4 %    Basophils 1 0 - 2 %    Immature Granulocytes 0 0 %    Segs Absolute 3.69 1.50 - 8.10 k/uL    Absolute Lymph # 2.81 1.10 - 3.70 k/uL    Absolute Mono # 0.63 0.10 - 1.20 k/uL    Absolute Eos # 0.23 0.00 - 0.44 k/uL    Basophils Absolute 0.05 0.00 - 0.20 k/uL    Absolute Immature Granulocyte <0.03 0.00 - 0.30 k/uL    WBC Morphology NOT REPORTED     RBC Morphology NOT REPORTED     Platelet Estimate NOT REPORTED    Basic Metabolic Panel w/ Reflex to MG   Result Value Ref Range    Glucose 90 70 - 99 mg/dL    BUN 6 6 - 20 mg/dL    CREATININE 0.46 (L) 0.50 - 0.90 mg/dL    Bun/Cre Ratio NOT REPORTED 9 - 20    Calcium 9.2 8.6 - 10.4 mg/dL    Sodium 134 (L) 135 - 144 mmol/L    Potassium 4.1 3.7 - 5.3 mmol/L    Chloride 103 98 - 107 mmol/L    CO2 19 (L) 20 - 31 mmol/L    Anion Gap 12 9 - 17 mmol/L    GFR Non-African American >60 >60 mL/min    GFR African American >60 >60 mL/min    GFR Comment          GFR Staging NOT REPORTED    Urinalysis Reflex to Culture    Specimen: Urine, clean catch   Result Value Ref Range    Color, UA Yellow Yellow    Turbidity UA Clear Clear    Glucose, Ur NEGATIVE NEGATIVE    Bilirubin Urine NEGATIVE NEGATIVE    Ketones, Urine NEGATIVE NEGATIVE    Specific Gravity, UA 1.009 1.005 - 1.030    Urine Hgb MODERATE (A) NEGATIVE    pH, UA 8.0 5.0 - 8.0    Protein, UA NEGATIVE NEGATIVE    Urobilinogen, Urine Normal Normal    Nitrite, Urine NEGATIVE NEGATIVE    Leukocyte Esterase, Urine NEGATIVE NEGATIVE    Urinalysis Comments NOT REPORTED    HCG, QUANTITATIVE, PREGNANCY   Result Value Ref Range    hCG Quant 2,135 (H) <5 IU/L   Microscopic Urinalysis   Result Value Ref Range    -          WBC, UA 0 TO 2 0 - 5 /HPF    RBC, UA 0 TO 2 0 - 4 /HPF    Casts UA  0 - 8 /LPF     0 TO 2 HYALINE Reference range defined for non-centrifuged specimen. Crystals, UA NOT REPORTED None /HPF    Epithelial Cells UA 2 TO 5 0 - 5 /HPF    Renal Epithelial, UA NOT REPORTED 0 /HPF    Bacteria, UA NOT REPORTED None    Mucus, UA NOT REPORTED None    Trichomonas, UA NOT REPORTED None    Amorphous, UA NOT REPORTED None    Other Observations UA NOT REPORTED NOT REQ. Yeast, UA NOT REPORTED None         RADIOLOGY:  US OB TRANSVAGINAL    (Results Pending)        EMERGENCY DEPARTMENT COURSE & MDM:    Patient with concerns of ectopic pregnancy. CBC shows normal white count and hemoglobin, hCG is increasing. Transvaginal ultrasound is also pending. Ectopic pregnancy remains a strong concern as she has had a previous ectopic pregnancy and has vaginal bleeding in the setting of pregnancy. Abdomen however right now is benign. Miscarriage is also a consideration bedside ultrasound failed to show any significant fluid in the pelvis, was unable to see a IUP. OB/GYN was consulted will be down to evaluate after ultrasound is complete. Patient is signed out to Dr. Jamey Gambino.     ED Course as of Oct 09 1553   Sat Oct 09, 2021   1545 Prev 967   hCG Quant(!): 2,135 [CS]   5868 Bacteria, UA: NOT REPORTED [CS]   1545 Hemoglobin Quant: 12.6 [CS]   4985 Pt signed out to Dr. Jamey Gambino    [CS] ED Course User Index  [CS] Poncho Lewis DO         PROCEDURES:      CONSULTS:  IP CONSULT TO OB GYN    CRITICAL CARE:  Please see attending note    FINAL IMPRESSION      1. Vaginal bleeding before 22 weeks gestation          DISPOSITION / PLAN     DISPOSITION        PATIENT REFERRED TO:  No follow-up provider specified.     DISCHARGE MEDICATIONS:  New Prescriptions    No medications on file       Poncho Lewis DO  Emergency Medicine Resident    (Please note that portions of thisnote were completed with a voice recognition program.  Efforts were made to edit the dictations but occasionally words are mis-transcribed.)        Poncho Lewis DO  Resident  10/09/21 2739

## 2021-10-09 NOTE — ED TRIAGE NOTES
Pt c/o moderate vaginal bleeding, states,\"i am 5 weeks pregnant, this happened a year ago. \" Denies pain. Pt stated she was uposed to be on synthroid because her thyroid level was too low to concieve. Pt usually takke adderrall and medication for migraines, but has not taken them since the pregnancy.

## 2021-10-11 ENCOUNTER — HOSPITAL ENCOUNTER (EMERGENCY)
Age: 31
Discharge: HOME | End: 2021-10-11
Attending: EMERGENCY MEDICINE
Payer: OTHER GOVERNMENT

## 2021-10-11 ENCOUNTER — APPOINTMENT (OUTPATIENT)
Dept: ULTRASOUND IMAGING | Age: 31
End: 2021-10-11
Payer: OTHER GOVERNMENT

## 2021-10-11 VITALS
BODY MASS INDEX: 23 KG/M2 | HEIGHT: 62 IN | OXYGEN SATURATION: 99 % | RESPIRATION RATE: 15 BRPM | DIASTOLIC BLOOD PRESSURE: 88 MMHG | HEART RATE: 88 BPM | WEIGHT: 125 LBS | TEMPERATURE: 98.2 F | SYSTOLIC BLOOD PRESSURE: 124 MMHG

## 2021-10-11 DIAGNOSIS — E34.9 ELEVATED SERUM HCG: ICD-10-CM

## 2021-10-11 DIAGNOSIS — Z32.00 ENCOUNTER FOR ASSESSMENT FOR SUSPECTED ECTOPIC PREGNANCY: Primary | ICD-10-CM

## 2021-10-11 LAB
ABO/RH: NORMAL
ABO/RH: NORMAL
ABSOLUTE EOS #: 0.3 K/UL (ref 0–0.44)
ABSOLUTE IMMATURE GRANULOCYTE: <0.03 K/UL (ref 0–0.3)
ABSOLUTE LYMPH #: 2.28 K/UL (ref 1.1–3.7)
ABSOLUTE MONO #: 0.85 K/UL (ref 0.1–1.2)
ALBUMIN SERPL-MCNC: 4.3 G/DL (ref 3.5–5.2)
ALBUMIN/GLOBULIN RATIO: 1.2 (ref 1–2.5)
ALP BLD-CCNC: 53 U/L (ref 35–104)
ALT SERPL-CCNC: 14 U/L (ref 5–33)
ANION GAP SERPL CALCULATED.3IONS-SCNC: 12 MMOL/L (ref 9–17)
ANTIBODY SCREEN: NEGATIVE
ANTIBODY SCREEN: NEGATIVE
ARM BAND NUMBER: NORMAL
ARM BAND NUMBER: NORMAL
AST SERPL-CCNC: 16 U/L
BASOPHILS # BLD: 1 % (ref 0–2)
BASOPHILS ABSOLUTE: 0.07 K/UL (ref 0–0.2)
BILIRUB SERPL-MCNC: 0.2 MG/DL (ref 0.3–1.2)
BUN BLDV-MCNC: 7 MG/DL (ref 6–20)
BUN/CREAT BLD: ABNORMAL (ref 9–20)
CALCIUM SERPL-MCNC: 9.2 MG/DL (ref 8.6–10.4)
CHLORIDE BLD-SCNC: 100 MMOL/L (ref 98–107)
CO2: 21 MMOL/L (ref 20–31)
CREAT SERPL-MCNC: 0.49 MG/DL (ref 0.5–0.9)
DIFFERENTIAL TYPE: NORMAL
EOSINOPHILS RELATIVE PERCENT: 4 % (ref 1–4)
EXPIRATION DATE: NORMAL
EXPIRATION DATE: NORMAL
GFR AFRICAN AMERICAN: >60 ML/MIN
GFR NON-AFRICAN AMERICAN: >60 ML/MIN
GFR SERPL CREATININE-BSD FRML MDRD: ABNORMAL ML/MIN/{1.73_M2}
GFR SERPL CREATININE-BSD FRML MDRD: ABNORMAL ML/MIN/{1.73_M2}
GLUCOSE BLD-MCNC: 92 MG/DL (ref 70–99)
HCG QUANTITATIVE: 990 IU/L
HCT VFR BLD CALC: 40.9 % (ref 36.3–47.1)
HEMOGLOBIN: 13.1 G/DL (ref 11.9–15.1)
IMMATURE GRANULOCYTES: 0 %
LYMPHOCYTES # BLD: 31 % (ref 24–43)
MCH RBC QN AUTO: 30.3 PG (ref 25.2–33.5)
MCHC RBC AUTO-ENTMCNC: 32 G/DL (ref 28.4–34.8)
MCV RBC AUTO: 94.7 FL (ref 82.6–102.9)
MONOCYTES # BLD: 12 % (ref 3–12)
NRBC AUTOMATED: 0 PER 100 WBC
PDW BLD-RTO: 12 % (ref 11.8–14.4)
PLATELET # BLD: 262 K/UL (ref 138–453)
PLATELET ESTIMATE: NORMAL
PMV BLD AUTO: 10.5 FL (ref 8.1–13.5)
POTASSIUM SERPL-SCNC: 3.8 MMOL/L (ref 3.7–5.3)
RBC # BLD: 4.32 M/UL (ref 3.95–5.11)
RBC # BLD: NORMAL 10*6/UL
SEG NEUTROPHILS: 52 % (ref 36–65)
SEGMENTED NEUTROPHILS ABSOLUTE COUNT: 3.81 K/UL (ref 1.5–8.1)
SODIUM BLD-SCNC: 133 MMOL/L (ref 135–144)
TOTAL PROTEIN: 7.8 G/DL (ref 6.4–8.3)
WBC # BLD: 7.3 K/UL (ref 3.5–11.3)
WBC # BLD: NORMAL 10*3/UL

## 2021-10-11 PROCEDURE — 80053 COMPREHEN METABOLIC PANEL: CPT

## 2021-10-11 PROCEDURE — 6360000002 HC RX W HCPCS: Performed by: STUDENT IN AN ORGANIZED HEALTH CARE EDUCATION/TRAINING PROGRAM

## 2021-10-11 PROCEDURE — 96374 THER/PROPH/DIAG INJ IV PUSH: CPT

## 2021-10-11 PROCEDURE — 76817 TRANSVAGINAL US OBSTETRIC: CPT

## 2021-10-11 PROCEDURE — 99284 EMERGENCY DEPT VISIT MOD MDM: CPT

## 2021-10-11 PROCEDURE — 84702 CHORIONIC GONADOTROPIN TEST: CPT

## 2021-10-11 PROCEDURE — 86850 RBC ANTIBODY SCREEN: CPT

## 2021-10-11 PROCEDURE — 96375 TX/PRO/DX INJ NEW DRUG ADDON: CPT

## 2021-10-11 PROCEDURE — 86900 BLOOD TYPING SEROLOGIC ABO: CPT

## 2021-10-11 PROCEDURE — 85025 COMPLETE CBC W/AUTO DIFF WBC: CPT

## 2021-10-11 PROCEDURE — 86901 BLOOD TYPING SEROLOGIC RH(D): CPT

## 2021-10-11 RX ORDER — KETOROLAC TROMETHAMINE 30 MG/ML
30 INJECTION, SOLUTION INTRAMUSCULAR; INTRAVENOUS ONCE
Status: COMPLETED | OUTPATIENT
Start: 2021-10-11 | End: 2021-10-11

## 2021-10-11 RX ORDER — METHOTREXATE 25 MG/ML
50 INJECTION INTRA-ARTERIAL; INTRAMUSCULAR; INTRATHECAL; INTRAVENOUS ONCE
Status: DISCONTINUED | OUTPATIENT
Start: 2021-10-11 | End: 2021-10-11 | Stop reason: HOSPADM

## 2021-10-11 RX ORDER — FENTANYL CITRATE 50 UG/ML
25 INJECTION, SOLUTION INTRAMUSCULAR; INTRAVENOUS ONCE
Status: COMPLETED | OUTPATIENT
Start: 2021-10-11 | End: 2021-10-11

## 2021-10-11 RX ADMIN — FENTANYL CITRATE 25 MCG: 50 INJECTION INTRAMUSCULAR; INTRAVENOUS at 12:53

## 2021-10-11 RX ADMIN — KETOROLAC TROMETHAMINE 30 MG: 30 INJECTION, SOLUTION INTRAMUSCULAR; INTRAVENOUS at 16:34

## 2021-10-11 ASSESSMENT — ENCOUNTER SYMPTOMS
NAUSEA: 0
BLOOD IN STOOL: 0
ANAL BLEEDING: 0
COUGH: 0
ABDOMINAL PAIN: 1
SHORTNESS OF BREATH: 0
RHINORRHEA: 0
DIARRHEA: 0
VOMITING: 0
SORE THROAT: 0

## 2021-10-11 ASSESSMENT — PAIN SCALES - GENERAL
PAINLEVEL_OUTOF10: 4
PAINLEVEL_OUTOF10: 5

## 2021-10-11 NOTE — CONSULTS
[address] [facility]    Patient Name: Alvino Mckeon     Patient : 1990  Room/Bed:   Admission Date/Time: 10/11/2021 11:25 AM  Primary Care Physician: Chris Kimball MD    Consulting Provider: Dr. Lilli Lopez  Reason for Consult: R/O Ectopic     CC:   Chief Complaint   Patient presents with    Ectopic Pregnancy     rule out                 HPI: Alvino Mckeon is a 36 y.o. female Trinda Hipps presents to the ED, after being sent in by recommendation from her OB. Of note, she was recently seen in the ED on 10/9/21 due vaginal bleeding and abdominal pain. At that time, there was high suspicion for miscarriage. Her beta was 200 at that time and TVUS showed no evidence of an intrauterine gestation with heterogeneous fluid in the pelvis w/ 1.0 cm complex left ovarian lesion favoring a corpus luteal cyst rather than an ectopic pregnancy. Today she states her bleeding has resolved, but is c/o cramping that has worsened since her las admission. Her Beta Hcg is 990. Her TVUS today shows no evidence of intrauterine pregnancy.  Complex intrauterine contents redemonstrated possibly representing blood products.  Complex left adnexal lesion measuring approximately 1.9 x 1.8 x 2.6 cm.  This appears adjacent to the ovary.  Ectopic pregnancy cannot be excluded.  She is Rh+ and her Hbg is stable on 13.1      REVIEW OF SYSTEMS:    Constitutional: negative fever, negative chills  HEENT: negative visual disturbances, negative headaches  Respiratory: negative dyspnea, negative cough  Cardiovascular: negative chest pain,  negative palpitations  Gastrointestinal: positive abdominal pain, negative RUQ pain, negative N/V, negative diarrhea, negative constipation  Genitourinary: negative dysuria, negative vaginal discharge, negative vaginal bleeding  Dermatological: negative rash, negative wounds  Hematologic: negative bleeding/clotting disorder  Immunologic: negative recent illness, negative recent sick contact, negative allergic reactions  Lymphatic: negative lymph nodes  Musculoskeletal: negative back pain, negative myalgias, negative arthralgias  Neurological:  negative dizziness, negative weakness  Behavior/Psych: negative depression, negative anxiety      OBSTETRICAL HISTORY:   OB History    Para Term  AB Living   1 0 0 0 1 0   SAB TAB Ectopic Molar Multiple Live Births   0 0 1 0 0 0      # Outcome Date GA Lbr Slava/2nd Weight Sex Delivery Anes PTL Lv   1 Ectopic 2020              Birth Comments: Right salpingectomy       PAST MEDICAL HISTORY:   has a past medical history of ADHD, Ectopic pregnancy, and Migraines. PAST SURGICAL HISTORY:   has a past surgical history that includes lymph node dissection (Left); Ectopic pregnancy surgery (); Breast lumpectomy (Right); and Breast surgery (Left, 10/28/2020). ALLERGIES:  Allergies as of 10/11/2021 - Fully Reviewed 10/11/2021   Allergen Reaction Noted    Sulfa antibiotics Nausea And Vomiting and Rash 10/28/2020       MEDICATIONS:  Current Facility-Administered Medications   Medication Dose Route Frequency Provider Last Rate Last Admin    Methotrexate Sodium (PF) (RHEUMATREX) chemo injection 80 mg  50 mg/m2 IntraMUSCular Once Domingo Joe, DO         Current Outpatient Medications   Medication Sig Dispense Refill    levothyroxine (SYNTHROID) 50 MCG tablet Take 1 tablet by mouth Daily 30 tablet 3    amphetamine-dextroamphetamine (ADDERALL XR) 15 MG extended release capsule Take 15 mg by mouth 2 times daily.  SUMAtriptan (IMITREX) 50 MG tablet Take 50 mg by mouth once as needed for Migraine         FAMILY HISTORY:  family history is not on file. SOCIAL HISTORY:   reports that she has been smoking. She has been smoking about 0.25 packs per day. She has never used smokeless tobacco. She reports current alcohol use.  She reports previous drug use.  ________________________________________________________________________ VITALS:  Vitals:    10/11/21 1134   BP: 124/88   Pulse: 88   Resp: 15   Temp: 98.2 °F (36.8 °C)   TempSrc: Oral   SpO2: 99%   Weight: 125 lb (56.7 kg)   Height: 5' 2\" (1.575 m)                                  INPUT/OUTPUT:  No intake/output data recorded. No intake/output data recorded. PHYSICAL EXAM:   General Appearance: Appears healthy. Alert; in no acute distress. Pleasant. Skin: Skin color, texture, turgor normal. No rashes or lesions. Lymphatic: No abnormally enlarged lymph nodes. HEENT: normocephalic and atraumatic, Thyroid normal to inspection and palpation  Respiratory: Normal expansion. Clear to auscultation. No rales, rhonchi, or wheezing.   Cardiovascular: normal rate, normal S1 and S2, no gallops, intact distal pulses and no carotid bruits  Breast: non-tender, no masses, lesions, dimpling, or galactorrhea   Abdomen: soft, non-tender, non-distended, no right upper quadrant tenderness and no CVA tenderness  Rectal Exam: exam declined by patient  Musculoskeletal: no gross abnormalities  Extremities: non-tender BLE and non-edematous  Psych:  oriented to time, place and person       LAB RESULTS:  Results for orders placed or performed during the hospital encounter of 10/11/21   HCG, QUANTITATIVE, PREGNANCY   Result Value Ref Range    hCG Quant 990 (H) <5 IU/L   CBC Auto Differential   Result Value Ref Range    WBC 7.3 3.5 - 11.3 k/uL    RBC 4.32 3.95 - 5.11 m/uL    Hemoglobin 13.1 11.9 - 15.1 g/dL    Hematocrit 40.9 36.3 - 47.1 %    MCV 94.7 82.6 - 102.9 fL    MCH 30.3 25.2 - 33.5 pg    MCHC 32.0 28.4 - 34.8 g/dL    RDW 12.0 11.8 - 14.4 %    Platelets 241 211 - 991 k/uL    MPV 10.5 8.1 - 13.5 fL    NRBC Automated 0.0 0.0 per 100 WBC    Differential Type NOT REPORTED     Seg Neutrophils 52 36 - 65 %    Lymphocytes 31 24 - 43 %    Monocytes 12 3 - 12 % hemorrhage. The endometrial echo complex has a maximal thickness of 1.3 cm. Gestational Sac(s):  Not visualized Yolk Sac:  Not visualized Fetal Pole:  Not visualized Crown Rump Length:  Not measured Fetal Heart Rate:  Not measured Right ovary: 3.5 x 2.5 x 2.8 cm. There are right ovarian follicles. Left ovary: 3.3 x 1.7 x 1.5 cm. There are left ovarian follicles. In addition, there is a 9 x 10 mm complex cyst favoring a corpus luteal cyst rather than an ectopic pregnancy. Free fluid: A trace amount of fluid is present in the cul de sac. 1. No evidence of an intrauterine gestation with heterogeneous fluid in the endometrial canal likely due to hemorrhage. Recommend serial beta HCG and pelvic ultrasound in 1 week to confirm resolution. 2. 1.0 cm complex left ovarian lesion favoring a corpus luteal cyst rather than an ectopic pregnancy.        Narrative   EXAMINATION:   FIRST TRIMESTER OBSTETRIC ULTRASOUND       10/11/2021       TECHNIQUE:   Transvaginal first trimester obstetric pelvic ultrasound was performed with   color Doppler flow evaluation.       COMPARISON:   October 9, 2021       HISTORY:   ORDERING SYSTEM PROVIDED HISTORY: No IUP on last TVUS, hcg 2000 with possible   luteal cyst, r/o ectopic   TECHNOLOGIST PROVIDED HISTORY:   No IUP on last TVUS, hcg 2000 with possible luteal cyst, r/o ectopic       FINDINGS:   Uterus: 8.8 x 4.3 x 5.6 cm.  Complex endometrial material redemonstrated.  No   evidence of gestational sac.       Gestational Sac(s):  None identified.       Yolk Sac:  Not applicable       Fetal Pole:  Not applicable       [place] Rump Length:  Not applicable       Fetal Heart Rate:  Not applicable       Right ovary: Status post right salpingectomy.       Left ovary: 2.7 x 1.3 x 2.7 cm.  Complex structure seen adjacent to the left   ovary measuring 1.9 x 1.8 x 2.6 cm.       Free fluid: Trace free fluid.       Estimated gestational age clinically: 5 weeks 2 days           Impression   No evidence of intrauterine pregnancy.  Complex intrauterine contents   redemonstrated possibly representing blood products.  Complex left adnexal   lesion measuring approximately 1.9 x 1.8 x 2.6 cm.  This appears adjacent to   the ovary.  Ectopic pregnancy cannot be excluded.  Recommend continued serial   beta HCG and follow-up ultrasound as indicated. ASSESSMENT & PLAN:    Alvino Mckeon is a 36 y.o. female  consulted to R/O Ectopic pregnancy   - She was sent in from her OB for evaluation to rule out ectopic pregnancy    - Her vitals are stable   - She is Rh+ and Hbg is stable at 13.1   - Her TVUS shows no evidence of intrauterine pregnancy. Complex intrauterine contents redemonstrated possibly representing blood products.  Complex left adnexal lesion measuring approximately 1.9 x 1.8 x 2.6 cm.  This appears adjacent to the ovary.  Ectopic pregnancy cannot be excluded   - CBC and CMP are wnl   - She received Fentanyl and Toradol x1 for pain with improvement   - Her abdomen is mildly tender but non acute   - Will give 1 dose of Methotrexate now due to decreasing Beta Hcg and concerning adnexal mass that is adjacent to her ovary   - Beta hcg ordered for day 4 and day 7   - Patient was given all precautions and she is aware to follow up with her OB provider   - She is stable for DC at now       Patient Active Problem List    Diagnosis Date Noted    History of ectopic pregnancy 10/09/2021     G1- R salpingectomy 2020      Pregnancy of unknown anatomic location 10/09/2021       Plan discussed with Dr. Jersey Alfonso, who is agreeable.      Uzma Brar DO  Ob/Gyn Resident  Pager: [phone] [place]  10/11/2021, 6:44 PM

## 2021-10-11 NOTE — ED NOTES
Pt sent to ED to rule an ectopic pregnancy. Pt was 5 weeks to her knowledge and have been experiencing abdominal pain. Abdominal pain has now subsided. The plan will be for her to receive and ultrasound. Pt has a hx of one previous ectopic pregnancy, resulting in the loss of one fallopian tube.        India Schroeder RN  10/11/21 1321

## 2021-10-11 NOTE — ED PROVIDER NOTES
[facility]     Emergency Department     Faculty Note/ Attestation      Pt Name: Juli Scruggs                                       MRN: [id number]  Alpa 1990  Date of evaluation: 10/11/2021  Patients PCP:    Mac Cevallos MD    Attestation  I performed a history and physical examination of the patient/ or directly observed  and discussed management with the resident. I reviewed the residents note and agree with the documented findings and plan of care. Any areas of disagreement are noted on the chart. I was personally present for the key portions of any procedures. I have documented in the chart those procedures where I was not present during the key portions. I have reviewed the emergency nurses triage note. I agree with the chief complaint, past medical history, past surgical history, allergies, medications, social and family history as documented unless otherwise noted below. For Physician Assistant/ Nurse Practitioner cases/documentation I have personally evaluated this patient and have completed at least one if not all key elements of the E/M (history, physical exam, and MDM). Additional findings are as noted. This patient was evaluated in the Emergency Department for symptoms described in the history of present illness. The patient was evaluated in the context of the global COVID-19 pandemic, which necessitated consideration that the patient might be at risk for infection with the SARS-CoV-2 virus that causes COVID-19. Institutional protocols and algorithms that pertain to the evaluation of patients at risk for COVID-19 are in a state of rapid change based on information released by regulatory bodies including the CDC and federal and state organizations. These policies and algorithms were followed during the patient's care in the ED.      Initial Screens:             Vitals:    Vitals:    10/11/21 1134   BP: 124/88   Pulse: 88   Resp: 15   Temp: 98.2 °F (36.8 °C)   TempSrc: Oral   SpO2: 99%   Weight: 125 lb (56.7 kg)   Height: 5' 2\" (1.575 m)       Chief Complaint      Chief Complaint   Patient presents with    Ectopic Pregnancy     rule out           height is 5' 2\" (1.575 m) and weight is 125 lb (56.7 kg). Her oral temperature is 98.2 °F (36.8 °C). Her blood pressure is 124/88 and her pulse is 88. Her respiration is 15 and oxygen saturation is 99%. DIAGNOSTIC RESULTS       RADIOLOGY:   No orders to display         LABS:  Labs Reviewed - No data to display      EMERGENCY DEPARTMENT COURSE:     -------------------------      BP: 124/88, Temp: 98.2 °F (36.8 °C), Pulse: 88, Resp: 15    System Problem List     Patient Active Problem List   Diagnosis    History of ectopic pregnancy    Pregnancy of unknown anatomic location         Comments  Chronic Prob List noted          Ganga Diane MD,, MD, F.A.C.E.P.   Attending Emergency Physician         Ganga Diane MD  10/11/21 2832

## 2021-10-11 NOTE — PROGRESS NOTES
Patient was assigned to me I did not see this patient.     En Amezcua, DO  Emergency medicine resident

## 2021-10-11 NOTE — ED PROVIDER NOTES
[facility]  Emergency Department  Faculty Sign-Out Addendum     Care of Camille Richmond was assumed from previous attending and is being seen for Ectopic Pregnancy (rule out )  . The patient's initial evaluation and plan have been discussed with the prior provider who initially evaluated the patient. Handoff taken on the following patient from prior Attending Physician:    Jesus Anderson    I was available and discussed any additional care issues that arose and coordinated the management plans with the resident(s) caring for the patient during my duty period. Any areas of disagreement with residents documentation of care or procedures are noted on the chart. I was personally present for the key portions of any/all procedures during my duty period. I have documented in the chart those procedures where I was not present during the key portions. EMERGENCY DEPARTMENT COURSE / MEDICAL DECISION MAKING:       MEDICATIONS GIVEN:  Orders Placed This Encounter   Medications    fentaNYL (SUBLIMAZE) injection 25 mcg    Methotrexate Sodium (PF) (RHEUMATREX) chemo injection 80 mg    ketorolac (TORADOL) injection 30 mg       LABS / RADIOLOGY:     Labs Reviewed   HCG, QUANTITATIVE, PREGNANCY - Abnormal; Notable for the following components:       Result Value    hCG Quant 990 (*)     All other components within normal limits   COMPREHENSIVE METABOLIC PANEL - Abnormal; Notable for the following components:    CREATININE 0.49 (*)     Sodium 133 (*)     Total Bilirubin 0.20 (*)     All other components within normal limits   CBC WITH AUTO DIFFERENTIAL   TYPE AND SCREEN       US OB TRANSVAGINAL    Result Date: 10/11/2021  EXAMINATION: FIRST TRIMESTER OBSTETRIC ULTRASOUND 10/11/2021 TECHNIQUE: Transvaginal first trimester obstetric pelvic ultrasound was performed with color Doppler flow evaluation.  COMPARISON: October 9, 2021 HISTORY: ORDERING SYSTEM PROVIDED HISTORY: No IUP on last TVUS, hcg 2000 with possible luteal cyst, r/o ectopic TECHNOLOGIST PROVIDED HISTORY: No IUP on last TVUS, hcg 2000 with possible luteal cyst, r/o ectopic FINDINGS: Uterus: 8.8 x 4.3 x 5.6 cm. Complex endometrial material redemonstrated. No evidence of gestational sac. Gestational Sac(s):  None identified. Yolk Sac:  Not applicable Fetal Pole:  Not applicable Crown Rump Length:  Not applicable Fetal Heart Rate:  Not applicable Right ovary: Status post right salpingectomy. Left ovary: 2.7 x 1.3 x 2.7 cm. Complex structure seen adjacent to the left ovary measuring 1.9 x 1.8 x 2.6 cm. Free fluid: Trace free fluid. Estimated gestational age clinically: 5 weeks 2 days     No evidence of intrauterine pregnancy. Complex intrauterine contents redemonstrated possibly representing blood products. Complex left adnexal lesion measuring approximately 1.9 x 1.8 x 2.6 cm. This appears adjacent to the ovary. Ectopic pregnancy cannot be excluded. Recommend continued serial beta HCG and follow-up ultrasound as indicated. US OB TRANSVAGINAL    Result Date: 10/9/2021  EXAMINATION: FIRST TRIMESTER OBSTETRIC ULTRASOUND 10/9/2021 TECHNIQUE: Transvaginal first trimester obstetric pelvic ultrasound was performed with color Doppler flow evaluation. COMPARISON: None HISTORY: ORDERING SYSTEM PROVIDED HISTORY: Hx of ruptured ectopic.  vaginal bleeding. ~5w pregnant TECHNOLOGIST PROVIDED HISTORY: Hx of ruptured ectopic.  vaginal bleeding. ~5w pregnant FINDINGS: Uterus: The gravid uterus is unremarkable measuring 7.8 x 5.1 x 5.8 cm. However, heterogeneous fluid is present within the endometrial canal due to hemorrhage. The endometrial echo complex has a maximal thickness of 1.3 cm. Gestational Sac(s):  Not visualized Yolk Sac:  Not visualized Fetal Pole:  Not visualized Crown Rump Length:  Not measured Fetal Heart Rate:  Not measured Right ovary: 3.5 x 2.5 x 2.8 cm. There are right ovarian follicles. Left ovary: 3.3 x 1.7 x 1.5 cm.   There are left ovarian follicles. In addition, there is a 9 x 10 mm complex cyst favoring a corpus luteal cyst rather than an ectopic pregnancy. Free fluid: A trace amount of fluid is present in the cul de sac. 1. No evidence of an intrauterine gestation with heterogeneous fluid in the endometrial canal likely due to hemorrhage. Recommend serial beta HCG and pelvic ultrasound in 1 week to confirm resolution. 2. 1.0 cm complex left ovarian lesion favoring a corpus luteal cyst rather than an ectopic pregnancy. RECENT VITALS:     Temp: 98.2 °F (36.8 °C),  Pulse: 88, Resp: 15, BP: 124/88, SpO2: 99 %    This patient is a 36 y.o. Female with concern for ectopic pregnancy. Sent in for repeat ultrasound. Ultrasound shows continued complex cystic structure in the adnexa, no IUP. hCG downtrending.   OB consulted    OUTSTANDING TASKS / RECOMMENDATIONS:    1. OB recommendations      Laxmi Owen MD, Nai Brumfield  Attending Emergency Physician  [facility] ED       Foster Abdalla MD  10/11/21 1178

## 2021-10-11 NOTE — ED PROVIDER NOTES
[facility] ED  Emergency Department Encounter  EmergencyMedicine Resident     Pt Marie Huff  MRN: [id number]  [place] 1990  Date of evaluation: 10/11/21  PCP:  Tierra Paz MD    This patient was evaluated in the Emergency Department for symptoms described in the history of present illness. The patient was evaluated in the context of the global COVID-19 pandemic, which necessitated consideration that the patient might be at risk for infection with the SARS-CoV-2 virus that causes COVID-19. Institutional protocols and algorithms that pertain to the evaluation of patients at risk for COVID-19 are in a state of rapid change based on information released by regulatory bodies including the CDC and federal and state organizations. These policies and algorithms were followed during the patient's care in the ED. CHIEF COMPLAINT       Chief Complaint   Patient presents with    Ectopic Pregnancy     rule out        HISTORY OF PRESENT ILLNESS  (Location/Symptom, Timing/Onset, Context/Setting, Quality, Duration, Modifying Factors, Severity.)      Rahat Daniels is a 36 y.o. female who presents with concern for ectopic pregnancy. Patient was sent by her OB Dr. Sanjuana Mason.  Patient had a transvaginal ultrasound 2 days ago on 10/9 that showed no evidence of an IUP with heterogeneous fluid in the endometrial canal likely due to hemorrhage, and a 1 cm complex left ovarian lesion (corpus luteal cyst versus ectopic). hCG was doubling, 152 on 10/4, 967 on 10/11, 2135 on 10/9. Has a history of ectopic pregnancy x 1 and right salpingectomy. Patient was concerned as she has not experienced any vaginal bleeding to date, and was told that she is having a miscarriage and to expect vaginal bleeding. She is experiencing some constant lower abdominal pain, denies intermittent cramping. Has not been taking any medications.   Was originally scheduled for repeat TVUS 1 week from last ultrasound, but OB would like her to be reevaluated today. Denies chest pain, shortness of breath, fevers, headache, lightheadedness, numbness, syncope, nausea, vomiting, diarrhea, vaginal bleeding, vaginal discharge, dysuria, hematuria, dyspareunia. PAST MEDICAL / SURGICAL / SOCIAL / FAMILY HISTORY      has a past medical history of ADHD, Ectopic pregnancy, and Migraines. has a past surgical history that includes lymph node dissection (Left); Ectopic pregnancy surgery (2020); Breast lumpectomy (Right); and Breast surgery (Left, 10/28/2020). Social History     Socioeconomic History    Marital status: Single     Spouse name: Not on file    Number of children: Not on file    Years of education: Not on file    Highest education level: Not on file   Occupational History    Not on file   Tobacco Use    Smoking status: Current Every Day Smoker     Packs/day: 0.25    Smokeless tobacco: Never Used   Vaping Use    Vaping Use: Never used   Substance and Sexual Activity    Alcohol use: Yes    Drug use: Not Currently    Sexual activity: Yes     Partners: Male   Other Topics Concern    Not on file   Social History Narrative    Not on file     Social Determinants of Health     Financial Resource Strain:     Difficulty of Paying Living Expenses:    Food Insecurity:     Worried About Running Out of Food in the Last Year:     [address] in the Last Year:    Transportation Needs:     Lack of Transportation (Medical):      Lack of Transportation (Non-Medical):    Physical Activity:     Days of Exercise per Week:     Minutes of Exercise per Session:    Stress:     Feeling of Stress :    Social Connections:     Frequency of Communication with Friends and Family:     Frequency of Social Gatherings with Friends and Family:     Attends [employer] Services:     Active Member of Clubs or Organizations:     Attends Club or Organization Meetings:     Marital Status:    Intimate Partner Violence:     Fear of Current or Ex-Partner:     Emotionally Abused:     Physically Abused:     Sexually Abused:        History reviewed. No pertinent family history. Allergies:  Sulfa antibiotics    Home Medications:  Prior to Admission medications    Medication Sig Start Date End Date Taking? Authorizing Provider   levothyroxine (SYNTHROID) 50 MCG tablet Take 1 tablet by mouth Daily 9/30/21   Antonio Barreto DO   amphetamine-dextroamphetamine (ADDERALL XR) 15 MG extended release capsule Take 15 mg by mouth 2 times daily. Historical Provider, MD   SUMAtriptan (IMITREX) 50 MG tablet Take 50 mg by mouth once as needed for Migraine    Historical Provider, MD       REVIEW OF SYSTEMS    (2-9 systems for level 4, 10 or more for level 5)      Review of Systems   Constitutional: Negative for activity change, appetite change, diaphoresis and fever. HENT: Negative for congestion, rhinorrhea and sore throat. Eyes: Negative for visual disturbance. Respiratory: Negative for cough and shortness of breath. Cardiovascular: Negative for chest pain and palpitations. Gastrointestinal: Positive for abdominal pain. Negative for anal bleeding, blood in stool, diarrhea, nausea and vomiting. Genitourinary: Negative for dysuria, flank pain, frequency, hematuria, vaginal bleeding, vaginal discharge and vaginal pain. Musculoskeletal: Negative for arthralgias and myalgias. Skin: Negative for pallor, rash and wound. Neurological: Negative for dizziness, weakness, light-headedness and headaches. All other systems reviewed and are negative. PHYSICAL EXAM   (up to 7 for level 4, 8 or more for level 5)      INITIAL VITALS:   /88   Pulse 88   Temp 98.2 °F (36.8 °C) (Oral)   Resp 15   Ht 5' 2\" (1.575 m)   Wt 125 lb (56.7 kg)   SpO2 99%   BMI 22.86 kg/m²     Physical Exam  Vitals reviewed. Constitutional:       General: She is not in acute distress. HENT:      Head: Normocephalic and atraumatic.       Left Ear: Tympanic membrane normal. Nose: Nose normal.      Mouth/Throat:      Mouth: Mucous membranes are moist.      Pharynx: Oropharynx is clear. Eyes:      Extraocular Movements: Extraocular movements intact. Conjunctiva/sclera: Conjunctivae normal.      Pupils: Pupils are equal, round, and reactive to light. Cardiovascular:      Rate and Rhythm: Normal rate and regular rhythm. Heart sounds: Normal heart sounds. No murmur heard. Pulmonary:      Breath sounds: Normal breath sounds. No stridor. No wheezing, rhonchi or rales. Abdominal:      Palpations: Abdomen is soft. Tenderness: There is abdominal tenderness (Mild diffuse tenderness, no rigidity or guarding). There is no right CVA tenderness, left CVA tenderness, guarding or rebound. Genitourinary:     Comments: Speculum exam not performed at present, as patient is not experiencing any vaginal bleeding or abdominal cramping. Per OB, okay to hold speculum exam for now  Musculoskeletal:         General: Normal range of motion. Cervical back: Neck supple. Skin:     Capillary Refill: Capillary refill takes less than 2 seconds. Coloration: Skin is not pale. Neurological:      General: No focal deficit present. Mental Status: She is alert and oriented to person, place, and time. Sensory: No sensory deficit. Motor: No weakness.       Deep Tendon Reflexes: Reflexes normal.       DIFFERENTIAL  DIAGNOSIS     PLAN (LABS / IMAGING / EKG):  Orders Placed This Encounter   Procedures    US OB TRANSVAGINAL    HCG, QUANTITATIVE, PREGNANCY    CBC Auto Differential    Comprehensive Metabolic Panel    hCG, Quantitative, Pregnancy    hCG, Quantitative, Pregnancy    Inpatient consult to Obstetrics / Gynecology    TYPE AND SCREEN     MEDICATIONS ORDERED:  Orders Placed This Encounter   Medications    fentaNYL (SUBLIMAZE) injection 25 mcg    Methotrexate Sodium (PF) (RHEUMATREX) chemo injection 80 mg    ketorolac (TORADOL) injection 30 mg     DDX: Ectopic pregnancy vs complex ovarian cyst vs uterine rupture vs ovarian rupture vs threatened     DIAGNOSTIC RESULTS / EMERGENCY DEPARTMENT COURSE / MDM   LAB RESULTS:  Results for orders placed or performed during the hospital encounter of 10/11/21   HCG, QUANTITATIVE, PREGNANCY   Result Value Ref Range    hCG Quant 990 (H) <5 IU/L   CBC Auto Differential   Result Value Ref Range    WBC 7.3 3.5 - 11.3 k/uL    RBC 4.32 3.95 - 5.11 m/uL    Hemoglobin 13.1 11.9 - 15.1 g/dL    Hematocrit 40.9 36.3 - 47.1 %    MCV 94.7 82.6 - 102.9 fL    MCH 30.3 25.2 - 33.5 pg    MCHC 32.0 28.4 - 34.8 g/dL    RDW 12.0 11.8 - 14.4 %    Platelets 372 655 - 946 k/uL    MPV 10.5 8.1 - 13.5 fL    NRBC Automated 0.0 0.0 per 100 WBC    Differential Type NOT REPORTED     Seg Neutrophils 52 36 - 65 %    Lymphocytes 31 24 - 43 %    Monocytes 12 3 - 12 %    Eosinophils % 4 1 - 4 %    Basophils 1 0 - 2 %    Immature Granulocytes 0 0 %    Segs Absolute 3.81 1.50 - 8.10 k/uL    Absolute Lymph # 2.28 1.10 - 3.70 k/uL    Absolute Mono # 0.85 0.10 - 1.20 k/uL    Absolute Eos # 0.30 0.00 - 0.44 k/uL    Basophils Absolute 0.07 0.00 - 0.20 k/uL    Absolute Immature Granulocyte <0.03 0.00 - 0.30 k/uL    WBC Morphology NOT REPORTED     RBC Morphology NOT REPORTED     Platelet Estimate NOT REPORTED    Comprehensive Metabolic Panel   Result Value Ref Range    Glucose 92 70 - 99 mg/dL    BUN 7 6 - 20 mg/dL    CREATININE 0.49 (L) 0.50 - 0.90 mg/dL    Bun/Cre Ratio NOT REPORTED 9 - 20    Calcium 9.2 8.6 - 10.4 mg/dL    Sodium 133 (L) 135 - 144 mmol/L    Potassium 3.8 3.7 - 5.3 mmol/L    Chloride 100 98 - 107 mmol/L    CO2 21 20 - 31 mmol/L    Anion Gap 12 9 - 17 mmol/L    Alkaline Phosphatase 53 35 - 104 U/L    ALT 14 5 - 33 U/L    AST 16 <32 U/L    Total Bilirubin 0.20 (L) 0.3 - 1.2 mg/dL    Total Protein 7.8 6.4 - 8.3 g/dL    Albumin 4.3 3.5 - 5.2 g/dL    Albumin/Globulin Ratio 1.2 1.0 - 2.5    GFR Non-African American >60 >60 mL/min    GFR African American >60 >60 mL/min    GFR Comment          GFR Staging NOT REPORTED    TYPE AND SCREEN   Result Value Ref Range    Expiration Date 10/14/2021,2359     Arm Band Number [id number]     ABO/Rh A POSITIVE     Antibody Screen NEGATIVE        IMPRESSION: hCG quant decreasing, was 2000 on 10/9    RADIOLOGY:  US OB TRANSVAGINAL    Result Date: 10/11/2021  EXAMINATION: FIRST TRIMESTER OBSTETRIC ULTRASOUND 10/11/2021 TECHNIQUE: Transvaginal first trimester obstetric pelvic ultrasound was performed with color Doppler flow evaluation. COMPARISON: October 9, 2021 HISTORY: ORDERING SYSTEM PROVIDED HISTORY: No IUP on last TVUS, hcg 2000 with possible luteal cyst, r/o ectopic TECHNOLOGIST PROVIDED HISTORY: No IUP on last TVUS, hcg 2000 with possible luteal cyst, r/o ectopic FINDINGS: Uterus: 8.8 x 4.3 x 5.6 cm. Complex endometrial material redemonstrated. No evidence of gestational sac. Gestational Sac(s):  None identified. Yolk Sac:  Not applicable Fetal Pole:  Not applicable Crown Rump Length:  Not applicable Fetal Heart Rate:  Not applicable Right ovary: Status post right salpingectomy. Left ovary: 2.7 x 1.3 x 2.7 cm. Complex structure seen adjacent to the left ovary measuring 1.9 x 1.8 x 2.6 cm. Free fluid: Trace free fluid. Estimated gestational age clinically: 5 weeks 2 days     No evidence of intrauterine pregnancy. Complex intrauterine contents redemonstrated possibly representing blood products. Complex left adnexal lesion measuring approximately 1.9 x 1.8 x 2.6 cm. This appears adjacent to the ovary. Ectopic pregnancy cannot be excluded. Recommend continued serial beta HCG and follow-up ultrasound as indicated. EMERGENCY DEPARTMENT COURSE:  ED Course as of Oct 11 1659   Mon Oct 11, 2021   1149 E fast negative    [JG]   1232 hCG Quant(!): 990 [JG]   3305 Patient having significant abdominal pain, asking for medication.   Had shared decision-making with the patient about what to use for pain control, given that there is nothing intrauterine on the ultrasound we discussed using Toradol, fentanyl, morphine, or other options. Patient would like a low dose of fentanyl at this time    [JG]   1306 Patient transported to ultrasound. OB will evaluate once ultrasound results are back    [JG]   1407 Basophils Absolute: 0.07 [RK]   1431 OB discussed patient's options with her at bedside pending final read of TVUS. They discussed going home and trending hCG with repeat TVUS in one week, admission for cytotec, and D&C. Patient is discussing with her . Pending final radiology read of TVUS    [JG]   0924 115 Radiology read somewhat indeterminate. No evidence of IUP. Complex intrauterine contents redemonstrated. Ultrasound showing a complex left adnexal lesion measuring 1.9 x 1.8 x 2.6 cm adjacent to the ovary, per radiology reading cannot exclude ectopic pregnancy. Patient voices frustration that the ectopic pregnancy cannot be ruled in or out at this time, and does not want to undergo Cytotec without certainty that it will work. I will discuss next steps with her OB Dr. Leslie Ferrer at patient's request    [JG]   [phone] Patient requested that I personally speak with Dr. Leslie Ferrer on her behalf, was able to get a hold of him. He would prefer for patient not to undergo D&C or instrumentation, as patient is not currently ruptured. He agrees she is at increased risk for ectopic with history of ectopic, but current imaging cannot distinguish between corpus luteal cyst or ectopic. He would recommend methotrexate or Cytotec to keep the patient out of the OR and prevent possible rupture. Discussed with patient at length, she is extremely appreciative of his recommendations, and will agree to methotrexate or Cytotec at this time.   OB residents notified, will discuss with her at bedside    [JG]   9306 OB resident at bedside to explain methotrexate administration    [JG]   1554 Methotrexate being prepared by pharmacy now    [JG]   [phone] [id number] Methotrexate ready, OB paged    [JG]   7092 OB at bedside to administer methotrexate    [JG]   1654 Methotrexate administered successfully, patient given return instructions for repeat hcg on 10/14 and 10/17 and follow up with OB. Return precautions for ED discussed, including worsening abdominal pain, excessive vaginal bleeding, syncope, pallor, or other concerns. Patient voiced understanding. [JG]      ED Course User Index  [JG] Adwoa Lott MD  [RK] Derrick Andre MD     PROCEDURES:  FAST EXAM:      INDICATION:  Patient presenting with medical necessity to evaluate for the presence or absence of intraperitoneal fluid. PROCEDURE:  A limited, bedside abdominal ultrasound exam was performed. Using the low frequency convex or phase array probe, longitudinal and/or transverse views of structures in hepatorenal space, splenorenal space, bladder were obtained. FINDINGS:  negative for free intra-abdominal fluid  The study was technically adequate    IMAGES:  Electronically uploaded to the PACS system    Adwoa Lott MD  Emergency Medicine Resident    CONSULTS:  IP CONSULT TO OB GYN      FINAL IMPRESSION      1. Encounter for assessment for suspected ectopic pregnancy    2.  Elevated serum hCG          DISPOSITION / PLAN     DISPOSITION  Discharged 10/11/2021 5:00 PM    PATIENT REFERRED TO:  Tanna Ring, [facility] [id number] [address]  [phone]    Schedule an appointment as soon as possible for a visit on 10/14/2021      [facility]  [address] [facility] [address]  [phone]  Go today  If symptoms worsen      DISCHARGE MEDICATIONS:  Current Discharge Medication List          Adwoa Lott MD  Emergency Medicine Resident    (Please note that portions of thisnote were completed with a voice recognition program.  Efforts were made to edit the dictations but occasionally words are mis-transcribed.)       Adwoa Lott MD  Resident  10/11/21 [address]

## 2021-10-13 ENCOUNTER — PATIENT MESSAGE (OUTPATIENT)
Dept: OBGYN CLINIC | Age: 31
End: 2021-10-13

## 2021-10-13 DIAGNOSIS — Z87.59 HISTORY OF ECTOPIC PREGNANCY: Primary | ICD-10-CM

## 2021-10-13 RX ORDER — SENNA AND DOCUSATE SODIUM 50; 8.6 MG/1; MG/1
1 TABLET, FILM COATED ORAL 2 TIMES DAILY
Qty: 30 TABLET | Refills: 0 | Status: SHIPPED | OUTPATIENT
Start: 2021-10-13

## 2021-10-13 RX ORDER — HYDROCODONE BITARTRATE AND ACETAMINOPHEN 5; 325 MG/1; MG/1
1 TABLET ORAL EVERY 6 HOURS PRN
Qty: 8 TABLET | Refills: 0 | Status: SHIPPED | OUTPATIENT
Start: 2021-10-13 | End: 2021-10-15

## 2021-10-13 NOTE — TELEPHONE ENCOUNTER
From: Daniele Sheldon  To: Mirian Samson DO  Sent: 10/13/2021 12:50 PM EDT  Subject: Non-Urgent Medical Question    Hello,   How long after the methotrexate should my abdominal subside? It's still feeling a decent around of pain in my lower left side. I just don't want to go to the ER again if it's nothing.

## 2021-10-13 NOTE — TELEPHONE ENCOUNTER
spoek with Dr. Sanjuana Mason he stated, pericolace BID Milk of mag. Also a few days of pain medications. rx for all pending.

## 2021-10-14 ENCOUNTER — HOSPITAL ENCOUNTER (OUTPATIENT)
Age: 31
Setting detail: SPECIMEN
Discharge: HOME | End: 2021-10-14
Payer: OTHER GOVERNMENT

## 2021-10-14 DIAGNOSIS — Z32.00 ENCOUNTER FOR ASSESSMENT FOR SUSPECTED ECTOPIC PREGNANCY: ICD-10-CM

## 2021-10-14 LAB — HCG QUANTITATIVE: 576 IU/L

## 2021-10-14 PROCEDURE — 36415 COLL VENOUS BLD VENIPUNCTURE: CPT

## 2021-10-14 PROCEDURE — 84702 CHORIONIC GONADOTROPIN TEST: CPT

## 2021-10-15 DIAGNOSIS — Z87.59 HISTORY OF ECTOPIC PREGNANCY: ICD-10-CM

## 2021-10-15 DIAGNOSIS — F41.9 ANXIETY: Primary | ICD-10-CM

## 2021-10-18 ENCOUNTER — CLINICAL DOCUMENTATION (OUTPATIENT)
Dept: BEHAVIORAL/MENTAL HEALTH CLINIC | Age: 31
End: 2021-10-18

## 2021-10-18 ENCOUNTER — HOSPITAL ENCOUNTER (OUTPATIENT)
Age: 31
Discharge: HOME | End: 2021-10-18
Payer: OTHER GOVERNMENT

## 2021-10-18 DIAGNOSIS — Z32.00 ENCOUNTER FOR ASSESSMENT FOR SUSPECTED ECTOPIC PREGNANCY: ICD-10-CM

## 2021-10-18 LAB — HCG QUANTITATIVE: 458 IU/L

## 2021-10-18 PROCEDURE — 36415 COLL VENOUS BLD VENIPUNCTURE: CPT

## 2021-10-18 PROCEDURE — 84702 CHORIONIC GONADOTROPIN TEST: CPT

## 2021-10-18 NOTE — PROGRESS NOTES
Warm handoff requested by Edil Butts DO and was completed. Patient scheduled for future face to face visit.

## 2021-10-19 DIAGNOSIS — O00.90 ECTOPIC PREGNANCY, UNSPECIFIED LOCATION, UNSPECIFIED WHETHER INTRAUTERINE PREGNANCY PRESENT: Primary | ICD-10-CM

## 2021-10-20 ENCOUNTER — OFFICE VISIT (OUTPATIENT)
Dept: BEHAVIORAL/MENTAL HEALTH CLINIC | Age: 31
End: 2021-10-20
Payer: OTHER GOVERNMENT

## 2021-10-20 DIAGNOSIS — F43.21 ADJUSTMENT DISORDER WITH DEPRESSED MOOD: Primary | ICD-10-CM

## 2021-10-20 PROCEDURE — 90791 PSYCH DIAGNOSTIC EVALUATION: CPT | Performed by: SOCIAL WORKER

## 2021-10-27 NOTE — PROGRESS NOTES
ADULT BEHAVIORAL HEALTH ASSESSMENT  Carry LACEY Henley-S  Licensed Independent      Visit Date: 10/20/2021   Time of appointment: 9am   Time spent with Patient: 55 minutes. This is patient's first appointment. Reason for Consult:  New Patient     PCP: Obi So MD      Pt and/or guardian was educated on the model of service to include a short-term intervention focused approach and as well as the limits of confidentiality (i.e. abuse reporting, suicide intervention, etc.). Pt and/or guardian indicated understanding. Pt and/or guardian provided informed consent for the behavioral health program.  Visit completed in person. Patient presented alone. Patient Location: [facility], [facility]  Provider Location: [facility], Anmol Aceves is a 36 y.o. female who presents for new evaluation and treatment of depression, stress and grief. She reports the following symptoms: depressed mood, anhedonia, fatigue/lack of energy and crying. Symptoms are causing impairment in her activities of daily living and employment. Pt has recently experienced her second ectopic pregnancy/pregnancy loss. She has a lot of worries about returning to work as she works in labor and delivery and is concerned of the triggers of the work itself with her own pain and grief. She is having a lot of worries and stress about her next steps for her health and future desire for parenthood after this loss. She is having difficulty is engaging in daily activity while she is coping and healing from this recent loss.      CURRENT MEDICATIONS    Current Outpatient Medications:     sennosides-docusate sodium (SENOKOT-S) 8.6-50 MG tablet, Take 1 tablet by mouth 2 times daily, Disp: 30 tablet, Rfl: 0    levothyroxine (SYNTHROID) 50 MCG tablet, Take 1 tablet by mouth Daily, Disp: 30 tablet, Rfl: 3    amphetamine-dextroamphetamine (ADDERALL XR) 15 MG extended release capsule, Take 15 mg by mouth 2 times daily. , Disp: , Rfl:     SUMAtriptan (IMITREX) 50 MG tablet, Take 50 mg by mouth once as needed for Migraine, Disp: , Rfl:      FAMILY MEDICAL/MH HISTORY   Her family history is not on file. PATIENT MENTAL HEALTH HISTORY  Laila Moore reports no previous concerns. Kimberli White is currently living with . She is employed as a nurse. She reports a previous history of trauma related to last ectopic pregnancy. Support system: , family      DRUG AND ALCOHOL CURRENT USE/HISTORY  TOBACCO:  She reports that she has been smoking. She has been smoking about 0.25 packs per day. She has never used smokeless tobacco.  ALCOHOL:  She reports current alcohol use. OTHER SUBSTANCES: She reports previous drug use. MENTAL STATUS EXAM  Affective and emotional state appeared sad. Suicidal ideation was denied. Homicidal ideation was denied. Hygiene was good   Dress was appropriate  Behavior was Calm and engaged  Attitude was Cooperative. Eye-contact was good . Speech is described as normal rate, normal volume and well articulated  Thought processes were logical without evidence of delusions, hallucinations, obsessions, or francois  Pt was oriented to person, place, time, and general circumstances with recent memory:  good and remote memory:  good. Insight is estimated to be good AEB a good  understanding of cyclical maladaptive patterns, and the ability to use insight to inform behavior change. Judgment was estimated to be good    PHQ Scores 10/26/2021   PHQ2 Score 4   PHQ9 Score 16     Interpretation of Total Score Depression Severity: 1-4 = Minimal depression, 5-9 = Mild depression, 10-14 = Moderate depression, 15-19 = Moderately severe depression, 20-27 = Severe depression    ASSESSMENT  Mariana Rojas presented to the appointment today for evaluation and treatment of symptoms of depression and grief.   She is currently deemed no risk to herself or others and meets criteria for:  Adjustment Disorder, with depressed mood AEB low interest, low energy, sadness following ectopic pregnancy/pregnancy loss. She will benefit from will benefit from brief and solution-focused consultation to address cognitive and behavioral interventions for grief, depressive symptoms symptoms. Mariana and/or guardian were in agreement with recommendations. INTERVENTION  orienting pt to process of brief behavioral health services, completing initial assessment of symptoms and needs, provided recommendations, rapport building and development of self-care mindset and commitment to self-care behaviors    DIAGNOSIS  Sienna Lopez was seen today for new patient. Diagnoses and all orders for this visit:    Adjustment disorder with depressed mood        PLAN  Pt will talk with supervisor about options for easing into work again, plan and follow through with self-care activities each day  Follow up in 1 weeks with Eran Kahn 88  Is interactive complexity present?   No  Reason:  N/A  Additional Supporting Information:  N/A       Electronically signed by Joseph Matamoros on 10/27/2021 at 11:24 AM

## 2021-10-29 ENCOUNTER — TELEPHONE (OUTPATIENT)
Dept: PSYCHOLOGY | Age: 31
End: 2021-10-29

## 2021-10-29 NOTE — TELEPHONE ENCOUNTER
Contacted patient today in regards to missed appointment. Left voicemail. Was call completed? If not, reason: Call was completed    Anything the provider should be aware of (if yes, please route call)?  No    Reason for Missed Appointment: patient did not answer call

## 2021-11-01 DIAGNOSIS — O00.90 ECTOPIC PREGNANCY, UNSPECIFIED LOCATION, UNSPECIFIED WHETHER INTRAUTERINE PREGNANCY PRESENT: Primary | ICD-10-CM

## 2021-11-03 ENCOUNTER — HOSPITAL ENCOUNTER (OUTPATIENT)
Age: 31
Discharge: HOME | End: 2021-11-03
Payer: OTHER GOVERNMENT

## 2021-11-03 DIAGNOSIS — O00.90 ECTOPIC PREGNANCY, UNSPECIFIED LOCATION, UNSPECIFIED WHETHER INTRAUTERINE PREGNANCY PRESENT: ICD-10-CM

## 2021-11-03 DIAGNOSIS — Z87.59 HISTORY OF ECTOPIC PREGNANCY: Primary | ICD-10-CM

## 2021-11-03 LAB — HCG QUANTITATIVE: 171 IU/L

## 2021-11-03 PROCEDURE — 36415 COLL VENOUS BLD VENIPUNCTURE: CPT

## 2021-11-03 PROCEDURE — 84702 CHORIONIC GONADOTROPIN TEST: CPT

## 2021-11-29 ENCOUNTER — APPOINTMENT (OUTPATIENT)
Dept: ULTRASOUND IMAGING | Age: 31
End: 2021-11-29
Payer: OTHER GOVERNMENT

## 2021-11-29 ENCOUNTER — HOSPITAL ENCOUNTER (EMERGENCY)
Age: 31
Discharge: HOME | End: 2021-11-29
Attending: EMERGENCY MEDICINE
Payer: OTHER GOVERNMENT

## 2021-11-29 VITALS
RESPIRATION RATE: 16 BRPM | DIASTOLIC BLOOD PRESSURE: 84 MMHG | BODY MASS INDEX: 22.86 KG/M2 | WEIGHT: 125 LBS | TEMPERATURE: 97.9 F | HEART RATE: 104 BPM | SYSTOLIC BLOOD PRESSURE: 135 MMHG | OXYGEN SATURATION: 98 %

## 2021-11-29 DIAGNOSIS — O00.90 ECTOPIC PREGNANCY, UNSPECIFIED LOCATION, UNSPECIFIED WHETHER INTRAUTERINE PREGNANCY PRESENT: ICD-10-CM

## 2021-11-29 DIAGNOSIS — O36.80X0 PREGNANCY OF UNKNOWN ANATOMIC LOCATION: ICD-10-CM

## 2021-11-29 DIAGNOSIS — Z92.21 H/O METHOTREXATE THERAPY: ICD-10-CM

## 2021-11-29 DIAGNOSIS — R10.2 PELVIC PAIN: ICD-10-CM

## 2021-11-29 DIAGNOSIS — Z87.59 HISTORY OF ECTOPIC PREGNANCY: Primary | ICD-10-CM

## 2021-11-29 DIAGNOSIS — Z87.59 HISTORY OF ECTOPIC PREGNANCY: ICD-10-CM

## 2021-11-29 DIAGNOSIS — N83.209 HEMORRHAGIC OVARIAN CYST: Primary | ICD-10-CM

## 2021-11-29 LAB
ABSOLUTE EOS #: 0.18 K/UL (ref 0–0.44)
ABSOLUTE IMMATURE GRANULOCYTE: <0.03 K/UL (ref 0–0.3)
ABSOLUTE LYMPH #: 3.1 K/UL (ref 1.1–3.7)
ABSOLUTE MONO #: 0.79 K/UL (ref 0.1–1.2)
ANION GAP SERPL CALCULATED.3IONS-SCNC: 12 MMOL/L (ref 9–17)
BASOPHILS # BLD: 1 % (ref 0–2)
BASOPHILS ABSOLUTE: 0.07 K/UL (ref 0–0.2)
BUN BLDV-MCNC: 10 MG/DL (ref 6–20)
BUN/CREAT BLD: ABNORMAL (ref 9–20)
CALCIUM SERPL-MCNC: 9.4 MG/DL (ref 8.6–10.4)
CHLORIDE BLD-SCNC: 102 MMOL/L (ref 98–107)
CO2: 21 MMOL/L (ref 20–31)
CREAT SERPL-MCNC: 0.45 MG/DL (ref 0.5–0.9)
DIFFERENTIAL TYPE: ABNORMAL
EOSINOPHILS RELATIVE PERCENT: 2 % (ref 1–4)
GFR AFRICAN AMERICAN: >60 ML/MIN
GFR NON-AFRICAN AMERICAN: >60 ML/MIN
GFR SERPL CREATININE-BSD FRML MDRD: ABNORMAL ML/MIN/{1.73_M2}
GFR SERPL CREATININE-BSD FRML MDRD: ABNORMAL ML/MIN/{1.73_M2}
GLUCOSE BLD-MCNC: 95 MG/DL (ref 70–99)
HCG QUANTITATIVE: 15 IU/L
HCT VFR BLD CALC: 36.4 % (ref 36.3–47.1)
HEMOGLOBIN: 12.4 G/DL (ref 11.9–15.1)
IMMATURE GRANULOCYTES: 0 %
LYMPHOCYTES # BLD: 37 % (ref 24–43)
MCH RBC QN AUTO: 31.5 PG (ref 25.2–33.5)
MCHC RBC AUTO-ENTMCNC: 34.1 G/DL (ref 28.4–34.8)
MCV RBC AUTO: 92.4 FL (ref 82.6–102.9)
MONOCYTES # BLD: 9 % (ref 3–12)
NRBC AUTOMATED: 0 PER 100 WBC
PDW BLD-RTO: 12.3 % (ref 11.8–14.4)
PLATELET # BLD: 263 K/UL (ref 138–453)
PLATELET ESTIMATE: ABNORMAL
PMV BLD AUTO: 10.2 FL (ref 8.1–13.5)
POTASSIUM SERPL-SCNC: 4.3 MMOL/L (ref 3.7–5.3)
RBC # BLD: 3.94 M/UL (ref 3.95–5.11)
RBC # BLD: ABNORMAL 10*6/UL
SEG NEUTROPHILS: 51 % (ref 36–65)
SEGMENTED NEUTROPHILS ABSOLUTE COUNT: 4.33 K/UL (ref 1.5–8.1)
SODIUM BLD-SCNC: 135 MMOL/L (ref 135–144)
WBC # BLD: 8.5 K/UL (ref 3.5–11.3)
WBC # BLD: ABNORMAL 10*3/UL

## 2021-11-29 PROCEDURE — 85025 COMPLETE CBC W/AUTO DIFF WBC: CPT

## 2021-11-29 PROCEDURE — 76705 ECHO EXAM OF ABDOMEN: CPT

## 2021-11-29 PROCEDURE — 6370000000 HC RX 637 (ALT 250 FOR IP): Performed by: STUDENT IN AN ORGANIZED HEALTH CARE EDUCATION/TRAINING PROGRAM

## 2021-11-29 PROCEDURE — 76830 TRANSVAGINAL US NON-OB: CPT

## 2021-11-29 PROCEDURE — 80048 BASIC METABOLIC PNL TOTAL CA: CPT

## 2021-11-29 PROCEDURE — 84702 CHORIONIC GONADOTROPIN TEST: CPT

## 2021-11-29 PROCEDURE — 99284 EMERGENCY DEPT VISIT MOD MDM: CPT

## 2021-11-29 RX ORDER — ACETAMINOPHEN 500 MG
1000 TABLET ORAL ONCE
Status: COMPLETED | OUTPATIENT
Start: 2021-11-29 | End: 2021-11-29

## 2021-11-29 RX ORDER — 0.9 % SODIUM CHLORIDE 0.9 %
500 INTRAVENOUS SOLUTION INTRAVENOUS ONCE
Status: DISCONTINUED | OUTPATIENT
Start: 2021-11-29 | End: 2021-11-29 | Stop reason: HOSPADM

## 2021-11-29 RX ADMIN — ACETAMINOPHEN 1000 MG: 500 TABLET ORAL at 11:03

## 2021-11-29 ASSESSMENT — PAIN SCALES - GENERAL
PAINLEVEL_OUTOF10: 5
PAINLEVEL_OUTOF10: 7

## 2021-11-29 ASSESSMENT — PAIN DESCRIPTION - LOCATION: LOCATION: ABDOMEN;BACK

## 2021-11-29 NOTE — CONSULTS
[address]    Patient Name: Xochilt Kauffman     Patient : 1990  Room/Bed:   Admission Date/Time: 2021  9:35 AM  Primary Care Physician: Lakeisha Bailey MD    Consulting Provider: Dr. Brenda Price  Reason for Consult: ectopic pregnancy    CC:   Chief Complaint   Patient presents with    Vaginal Bleeding     physician sent to ED to rule out a tubal rupture              HPI: Xochilt Kauffman is a 36 y.o. female Ardine Row presents with left lower abdominal pain. Patient was recently seen in October with positive hcg and no pregnancy seen in the uterus on ultrasound. These results were d/w the patient and given history of ectopic pregnancy and subsequent R salpingectomy, patient was treated for presumed ectopic pregnancy with methotrexatex. Patient reports after this treatment she had significant vaginal bleeding and lower abdominal pain which had been improving over the past six weeks. Over the past week she had minimal vaginal bleeding. Reports that it is occasionally spotting and does not wear panty liner as it is minimal. Patient did notice left lower abdominal pain that is progressing to a generalized dull pain that started yesterday. She has not experienced any nausea, vomiting, diarrhea, dysuria, or syncope. She also denies chest pain, shortness of breath. She does admit to neck pain which she relates was the only symptom when she presented for ruptured ectopic pregnancy last time. Declined a pelvic exam today. REVIEW OF SYSTEMS:   A minimum of an eleven point review of systems was completed.     Constitutional: negative fever, negative chills  HEENT: negative visual disturbances, negative headaches  Respiratory: negative dyspnea, negative cough  Cardiovascular: negative chest pain,  negative palpitations  Gastrointestinal: positive abdominal pain, negative RUQ pain, negative N/V, negative diarrhea, negative constipation  Genitourinary: negative dysuria, negative vaginal discharge, positive vaginal spotting  Dermatological: negative rash, negative wounds  Hematologic: negative bleeding/clotting disorder  Immunologic: negative recent illness, negative recent sick contact, negative allergic reactions  Lymphatic: negative lymph nodes  Musculoskeletal: positive neck pain, negative myalgias, negative arthralgias  Neurological:  negative dizziness, negative weakness  Behavior/Psych: negative depression, negative anxiety  _______________________________________________________________________    GYNECOLOGICAL HISTORY:  Age of Menarche: 8  Age of Menopause: n/a   Sexually Active: single partner, no contraception  STD History: no past history  Pap History: pt reports last was normal 2019  Colposcopy History: denies  Permanent Sterilization: denies  Reversible Birth Control: denies  Hormone Replacement Exposure: denies    OBSTETRICAL HISTORY:   OB History    Para Term  AB Living   1 0 0 0 1 0   SAB IAB Ectopic Molar Multiple Live Births   0 0 1 0 0 0      # Outcome Date GA Lbr Slava/2nd Weight Sex Delivery Anes PTL Lv   1 Ectopic 2020              Birth Comments: Right salpingectomy       PAST MEDICAL HISTORY:   has a past medical history of ADHD, Ectopic pregnancy, and Migraines. PAST SURGICAL HISTORY:   has a past surgical history that includes lymph node dissection (Left); Ectopic pregnancy surgery (2020); Breast lumpectomy (Right); and Breast surgery (Left, 10/28/2020).     ALLERGIES:  Allergies as of 2021 - Fully Reviewed 2021   Allergen Reaction Noted    Sulfa antibiotics Nausea And Vomiting and Rash 10/28/2020       MEDICATIONS:  Current Facility-Administered Medications   Medication Dose Route Frequency Provider Last Rate Last Admin    0.9 % sodium chloride bolus  500 mL IntraVENous Once Yeu MD Karley         Current Outpatient Medications   Medication Sig Dispense Refill    sennosides-docusate sodium (SENOKOT-S) 8.6-50 MG tablet Take 1 tablet by mouth 2 times daily 30 tablet 0    levothyroxine (SYNTHROID) 50 MCG tablet Take 1 tablet by mouth Daily 30 tablet 3    amphetamine-dextroamphetamine (ADDERALL XR) 15 MG extended release capsule Take 15 mg by mouth 2 times daily.  SUMAtriptan (IMITREX) 50 MG tablet Take 50 mg by mouth once as needed for Migraine         FAMILY HISTORY:  family history is not on file. SOCIAL HISTORY:   reports that she has been smoking. She has been smoking about 0.25 packs per day. She has never used smokeless tobacco. She reports current alcohol use. She reports previous drug use.  ________________________________________________________________________                                    Madelaine Serene:  Blood pressure 135/84, pulse 104, temperature 97.9 °F (36.6 °C), resp. rate 16, weight 125 lb (56.7 kg), SpO2 98 %. PHYSICAL EXAM:     General Appearance: Appears healthy. Alert; in no acute distress. Pleasant. Skin: Skin color, texture, turgor normal. No rashes or lesions. Lymphatic: No abnormally enlarged lymph nodes. Neck and EENT: normal atraumatic, no neck masses, normal thyroid, no jvd  Respiratory: Normal expansion. Clear to auscultation. No rales, rhonchi, or wheezing. Cardiovascular: regular rate and rhythm  Abdomen: soft, nondistended, tender to palpation in the left lower quadrant, no palpable masses. No rebound or guarding. Non acute abdomen    Pelvic Exam: external genitalia normal, no visible vaginal bleeding. Deferred internal exam.  Musculoskeletal: Range of motion normal in hips, knees, shoulders, and spine. , no gross abnormalities  Extremities: Warm and well perfused, non-tender BLE and non-edematous  Psych:  oriented to time, place and person      LAB RESULTS:  Results for orders placed or performed during the hospital encounter of 08/23/31   BASIC METABOLIC PANEL   Result Value Ref Range Glucose 95 70 - 99 mg/dL    BUN 10 6 - 20 mg/dL    CREATININE 0.45 (L) 0.50 - 0.90 mg/dL    Bun/Cre Ratio NOT REPORTED 9 - 20    Calcium 9.4 8.6 - 10.4 mg/dL    Sodium 135 135 - 144 mmol/L    Potassium 4.3 3.7 - 5.3 mmol/L    Chloride 102 98 - 107 mmol/L    CO2 21 20 - 31 mmol/L    Anion Gap 12 9 - 17 mmol/L    GFR Non-African American >60 >60 mL/min    GFR African American >60 >60 mL/min    GFR Comment          GFR Staging NOT REPORTED         DIAGNOSTICS:  US NON OB TRANSVAGINAL  Result Date: 11/29/2021    EXAMINATION: PELVIC ULTRASOUND; LIMITED ABDOMINAL ULTRASOUND 11/29/2021 TECHNIQUE: Transabdominal and transvaginal pelvic duplex ultrasound using B-mode/gray scaled imaging, Doppler spectral analysis and color flow Doppler was obtained. COMPARISON: Ultrasound dated 10/11/2021 HISTORY: ORDERING SYSTEM PROVIDED HISTORY: rule out ectopic, s/p methotrexate, vaginal bleeding TECHNOLOGIST PROVIDED HISTORY: Rule out ectopic, s/p methotrexate, vaginal bleeding Methotrexate in October 2021;  Beta HCG of 15 on 11/29/2021 (171 on 11/03/2021) FINDINGS: Measurements: Uterus: 7.0 x 3.9 x 5.1 cm Endometrial stripe: 1.6 cm Right Ovary:3.4 x 2.3 x 2.5 cm Left Ovary: 4.8 x 3.0 x 3.0 cm Ultrasound Findings: Uterus: Uterus demonstrates normal myometrial echotexture. Endometrial stripe: Thickness of the endometrial stripe is at the upper limits of normal.  Complex fluid previously seen within the uterine cavity is improved. Right Ovary: Right ovary is within normal limits. Color Doppler flow is demonstrated. Left Ovary: There is a complex, likely hemorrhagic cyst in the left ovary, measuring 3.4 x 2.6 x 2.8 cm. There is another complex thick-walled cystic lesion adjacent to or arising from the lateral aspect of the left ovary, measuring 3.4 x 2.0 x 2.1 cm. Color Doppler flow demonstrated in the ovary. Free Fluid: There is a small amount of free fluid in the pelvic cul-de-sac and left adnexa.  Limited abdominal ultrasound: Targeted ultrasound imaging was performed in the upper abdomen to assess for ascites. 1.  Endometrial stripe thickness is at the upper limits of normal, measuring 1.6 cm, but appears more homogeneous when compared to the previous ultrasound of 10/11/2021. Complex intrauterine contents have resolved. 2.  Hemorrhagic cyst in the left ovary, measuring up to 3.4 cm. Just adjacent to or arising from the lateral aspect of the left ovary, there is another complex lesion measuring up to 3.4 cm, which is indeterminate but similar to the previous study. Considerations include collapsed corpus luteal cyst versus sequelae of ectopic pregnancy. Continued surveillance of the beta HCG trend is recommended. Follow-up ultrasound is recommended in 6-8 weeks. 3.  Small amount of free fluid in the pelvic cul-de-sac and left adnexa. No ascites in the upper abdomen. 4.  No evidence of ovarian torsion. ASSESSMENT & PLAN:  Robinson Coronado is a 36 y.o. female  who presents with LLQ abd pain concerning for ectopic pregnancy s/p MTX in 2021   - VSS   - Declined spec exam    - Hemodynamically stable     - Hgb 12.4    - Hgb 13.1 on 10/11/21   - bHCG quant appropriately trending down     21 15    11/3/21 171     10/18/21 458    10/14/21 576    - BMP unremarkable    - TVUS today showing 3.4 cm hemorrhagic left ovarian cyst. adjacent to left ovary was a complex lesion measuring 3.4 cm likely collapsed corpus luteal cyst vs sequelae of ectopic pregnancy. Small amount of free fluid in culdesac. No torsions seen bilaterally    - She declines diagnostic laparoscopy today    - Explained to patient that her LLQ pain is likely attributed to the left hemorrhagic ovarian cyst. Explained that with her Hgb being stable at 12.4 with small amount of fluid in pelvis, unlikely cyst has ruptured. Zac Pallas has appropriately trended down, signifying that methotrexate has worked. Still need one more Hgb value less than 5.  Will order additional Quant to be completed in one week     - Recommended pelvic rest and contraception to prevent conceiving another pregnancy until this quant is < 5 and wait to attempt conceiving until she is 3 months post methotrexate. Reviewed avoiding sunlight/folate etc.    - Office to order follow up TVUS if indicated    - Patient already has a follow up appointment with Dr Darian Archuleta on 12/8/21. She is stable for discharge at this time. Bleeding precautions and worsening abdominal pain reviewed with her     Patient Active Problem List    Diagnosis Date Noted    History of ectopic pregnancy x2 10/09/2021     G1- R salpingectomy 2020  G2- Methotrexate      Pregnancy of unknown anatomic location 10/09/2021     10/11/21: Methotrexate given. 10/18/21: 2nd beta Hcg showed a 15% drop. Will order Beta Hcg weekly until <5. Patient aware          Plan discussed with Dr. Darian Archuleta, who is agreeable.      Shirley Salomon DO   ED Resident PGY 61 [facility]  11/29/2021, 11:04 AM         Senior Residents Attestation Statement  I was present with the resident physician during the history and exam. I discussed the findings and plans with the resident physician and agree as documented in her note     Delores Torres DO   OB/GYN Resident  Pager [phone] [facility]  11/29/2021, 4:26 PM

## 2021-11-29 NOTE — ED PROVIDER NOTES
[facility] ED  Emergency Department Encounter  Emergency Medicine Resident     Pt Name: Alvino Mckeon  MRN: [id number]  [place] 1990  Date of evaluation: 11/29/21  PCP:  Chris Kimball MD    [address] [place] [address]       Chief Complaint   Patient presents with    Vaginal Bleeding     physician sent to ED to rule out a tubal rupture       HISTORY OFPRESENT ILLNESS  (Location/Symptom, Timing/Onset, Context/Setting, Quality, Duration, Modifying Factors,Severity.)      Alvino Mckeon is a 36 y. o.yo female who Patient is G2, P0 who was recently diagnosed with ectopic pregnancy who is presenting to the ED for follow-up to get hCG done and ultrasound to facial to rule out ectopic. Patient follows with Dr. Jersey Alfonso, on 10/11/2021, an ultrasound was done which could not rule out an ectopic pregnancy because it showed a complex left adnexal lesion. Patient was to follow-up in 4 to 6 weeks to have a repeat ultrasound to ensure that it was not an ectopic pregnancy.  -Patient last hCG was 171, prior to that it was 458  -Patient reports that she is still having intermittent vaginal bleed, no symptoms of lightheadedness or chest pain or shortness of breath.  -Denies any urinary dysuria or frequency. PAST MEDICAL / SURGICAL / SOCIAL / FAMILY HISTORY      has a past medical history of ADHD, Ectopic pregnancy, and Migraines. has a past surgical history that includes lymph node dissection (Left); Ectopic pregnancy surgery (2020); Breast lumpectomy (Right); and Breast surgery (Left, 10/28/2020).      Social History     Socioeconomic History    Marital status:      Spouse name: Not on file    Number of children: Not on file    Years of education: Not on file    Highest education level: Not on file   Occupational History    Not on file   Tobacco Use    Smoking status: Current Every Day Smoker     Packs/day: 0.25    Smokeless tobacco: Never Used   Vaping Use    Vaping Use: Never used Substance and Sexual Activity    Alcohol use: Yes    Drug use: Not Currently    Sexual activity: Yes     Partners: Male   Other Topics Concern    Not on file   Social History Narrative    Not on file     Social Determinants of Health     Financial Resource Strain:     Difficulty of Paying Living Expenses: Not on file   Food Insecurity:     Worried About Running Out of Food in the Last Year: Not on file    Blake of Food in the Last Year: Not on file   Transportation Needs:     Lack of Transportation (Medical): Not on file    Lack of Transportation (Non-Medical): Not on file   Physical Activity:     Days of Exercise per Week: Not on file    Minutes of Exercise per Session: Not on file   Stress:     Feeling of Stress : Not on file   Social Connections:     Frequency of Communication with Friends and Family: Not on file    Frequency of Social Gatherings with Friends and Family: Not on file    Attends Yazdanism Services: Not on file    Active Member of [address] [place] [address] or Organizations: Not on file    Attends Club or Organization Meetings: Not on file    Marital Status: Not on file   Intimate Partner Violence:     Fear of Current or Ex-Partner: Not on file    Emotionally Abused: Not on file    Physically Abused: Not on file    Sexually Abused: Not on file   Housing Stability:     Unable to Pay for Housing in the Last Year: Not on file    Number of [place] in the Last Year: Not on file    Unstable Housing in the Last Year: Not on file       No family history on file. Allergies:  Sulfa antibiotics    Home Medications:  Prior to Admission medications    Medication Sig Start Date End Date Taking?  Authorizing Provider   sennosides-docusate sodium (SENOKOT-S) 8.6-50 MG tablet Take 1 tablet by mouth 2 times daily 10/13/21   ALEIDA Ferguson - CNP   levothyroxine (SYNTHROID) 50 MCG tablet Take 1 tablet by mouth Daily 9/30/21   Antonio Barreto DO   amphetamine-dextroamphetamine (ADDERALL XR) 15 MG extended release capsule Take 15 mg by mouth 2 times daily. Historical Provider, MD   SUMAtriptan (IMITREX) 50 MG tablet Take 50 mg by mouth once as needed for Migraine    Historical Provider, MD       REVIEW OFSYSTEMS    (2-9 systems for level 4, 10 or more for level 5)      Review of Systems   Constitutional: Negative for diaphoresis and fatigue. Respiratory: Negative for shortness of breath and stridor. Cardiovascular: Negative for chest pain and leg swelling. Gastrointestinal: Negative for abdominal pain, nausea and vomiting. Genitourinary: Positive for vaginal bleeding and vaginal pain. Skin: Negative for rash and wound. Neurological: Negative for light-headedness and headaches. Psychiatric/Behavioral: Negative for behavioral problems and confusion. PHYSICAL EXAM   (up to 7 for level 4, 8 or more forlevel 5)      INITIAL VITALS:   ED Triage Vitals [11/29/21 0919]   BP Temp Temp src Pulse Resp SpO2 Height Weight   135/84 97.9 °F (36.6 °C) -- 104 16 98 % -- 125 lb (56.7 kg)       Physical Exam  Constitutional:       Appearance: Normal appearance. HENT:      Head: Normocephalic and atraumatic. Mouth/Throat:      Mouth: Mucous membranes are moist.   Eyes:      Extraocular Movements: Extraocular movements intact. Pupils: Pupils are equal, round, and reactive to light. Cardiovascular:      Rate and Rhythm: Normal rate and regular rhythm. Pulmonary:      Effort: Pulmonary effort is normal. No respiratory distress. Abdominal:      General: There is no distension. Palpations: Abdomen is soft. Tenderness: There is no abdominal tenderness. Musculoskeletal:         General: No swelling or tenderness. Cervical back: No rigidity or tenderness. Skin:     General: Skin is warm. Capillary Refill: Capillary refill takes less than 2 seconds. Coloration: Skin is not jaundiced. Neurological:      General: No focal deficit present.       Mental Status: She is alert.   Psychiatric:         Mood and Affect: Mood normal.         Behavior: Behavior normal.         DIFFERENTIAL  DIAGNOSIS     PLAN (LABS / IMAGING / EKG):  Orders Placed This Encounter   Procedures    US NON OB TRANSVAGINAL    US ABDOMEN LIMITED    BASIC METABOLIC PANEL    HCG, QUANTITATIVE, PREGNANCY    CBC WITH AUTO DIFFERENTIAL    hCG, Quantitative, Pregnancy    Inpatient consult to Obstetrics / Gynecology    Discharge patient       MEDICATIONS ORDERED:  Orders Placed This Encounter   Medications    DISCONTD: 0.9 % sodium chloride bolus    acetaminophen (TYLENOL) tablet 1,000 mg       Initial MDM/Plan: 36 y.o. female who is here for repeat ultrasound to rule out ectopic pregnancy and also repeat ECG. Patient in no acute distress, her vitals are stable here in the department. Does have mild abdominal tenderness on palpation.  -Plan for repeat ultrasound  -Repeat hCG quant  -Obtain basic labs including CBC to rule out anemia and also BMP  -Inpatient consult to OB/GYN for further evaluation  -Dispo per OB team patient ultrasound has resulted and quant test resulted  DIAGNOSTIC RESULTS / EMERGENCYDEPARTMENT COURSE / MDM     LABS:  Labs Reviewed   BASIC METABOLIC PANEL - Abnormal; Notable for the following components:       Result Value    CREATININE 0.45 (*)     All other components within normal limits   HCG, QUANTITATIVE, PREGNANCY - Abnormal; Notable for the following components:    hCG Quant 15 (*)     All other components within normal limits   CBC WITH AUTO DIFFERENTIAL - Abnormal; Notable for the following components:    RBC 3.94 (*)     All other components within normal limits         RADIOLOGY:  US NON OB TRANSVAGINAL    Result Date: 11/29/2021  EXAMINATION: PELVIC ULTRASOUND; LIMITED ABDOMINAL ULTRASOUND 11/29/2021 TECHNIQUE: Transabdominal and transvaginal pelvic duplex ultrasound using B-mode/gray scaled imaging, Doppler spectral analysis and color flow Doppler was obtained.  COMPARISON: Ultrasound dated 10/11/2021 HISTORY: ORDERING SYSTEM PROVIDED HISTORY: rule out ectopic, s/p methotrexate, vaginal bleeding TECHNOLOGIST PROVIDED HISTORY: Rule out ectopic, s/p methotrexate, vaginal bleeding Methotrexate in October 2021;  Beta HCG of 15 on 11/29/2021 (171 on 11/03/2021) FINDINGS: Measurements: Uterus: 7.0 x 3.9 x 5.1 cm Endometrial stripe: 1.6 cm Right Ovary:3.4 x 2.3 x 2.5 cm Left Ovary: 4.8 x 3.0 x 3.0 cm Ultrasound Findings: Uterus: Uterus demonstrates normal myometrial echotexture. Endometrial stripe: Thickness of the endometrial stripe is at the upper limits of normal.  Complex fluid previously seen within the uterine cavity is improved. Right Ovary: Right ovary is within normal limits. Color Doppler flow is demonstrated. Left Ovary: There is a complex, likely hemorrhagic cyst in the left ovary, measuring 3.4 x 2.6 x 2.8 cm. There is another complex thick-walled cystic lesion adjacent to or arising from the lateral aspect of the left ovary, measuring 3.4 x 2.0 x 2.1 cm. Color Doppler flow demonstrated in the ovary. Free Fluid: There is a small amount of free fluid in the pelvic cul-de-sac and left adnexa. Limited abdominal ultrasound: Targeted ultrasound imaging was performed in the upper abdomen to assess for ascites. 1.  Endometrial stripe thickness is at the upper limits of normal, measuring 1.6 cm, but appears more homogeneous when compared to the previous ultrasound of 10/11/2021. Complex intrauterine contents have resolved. 2.  Hemorrhagic cyst in the left ovary, measuring up to 3.4 cm. Just adjacent to or arising from the lateral aspect of the left ovary, there is another complex lesion measuring up to 3.4 cm, which is indeterminate but similar to the previous study. Considerations include collapsed corpus luteal cyst versus sequelae of ectopic pregnancy. Continued surveillance of the beta HCG trend is recommended. Follow-up ultrasound is recommended in 6-8 weeks.  3.  Small amount of free fluid in the pelvic cul-de-sac and left adnexa. No ascites in the upper abdomen. 4.  No evidence of ovarian torsion. US ABDOMEN LIMITED    Result Date: 11/29/2021  EXAMINATION: PELVIC ULTRASOUND; LIMITED ABDOMINAL ULTRASOUND 11/29/2021 TECHNIQUE: Transabdominal and transvaginal pelvic duplex ultrasound using B-mode/gray scaled imaging, Doppler spectral analysis and color flow Doppler was obtained. COMPARISON: Ultrasound dated 10/11/2021 HISTORY: ORDERING SYSTEM PROVIDED HISTORY: rule out ectopic, s/p methotrexate, vaginal bleeding TECHNOLOGIST PROVIDED HISTORY: Rule out ectopic, s/p methotrexate, vaginal bleeding Methotrexate in October 2021;  Beta HCG of 15 on 11/29/2021 (171 on 11/03/2021) FINDINGS: Measurements: Uterus: 7.0 x 3.9 x 5.1 cm Endometrial stripe: 1.6 cm Right Ovary:3.4 x 2.3 x 2.5 cm Left Ovary: 4.8 x 3.0 x 3.0 cm Ultrasound Findings: Uterus: Uterus demonstrates normal myometrial echotexture. Endometrial stripe: Thickness of the endometrial stripe is at the upper limits of normal.  Complex fluid previously seen within the uterine cavity is improved. Right Ovary: Right ovary is within normal limits. Color Doppler flow is demonstrated. Left Ovary: There is a complex, likely hemorrhagic cyst in the left ovary, measuring 3.4 x 2.6 x 2.8 cm. There is another complex thick-walled cystic lesion adjacent to or arising from the lateral aspect of the left ovary, measuring 3.4 x 2.0 x 2.1 cm. Color Doppler flow demonstrated in the ovary. Free Fluid: There is a small amount of free fluid in the pelvic cul-de-sac and left adnexa. Limited abdominal ultrasound: Targeted ultrasound imaging was performed in the upper abdomen to assess for ascites. 1.  Endometrial stripe thickness is at the upper limits of normal, measuring 1.6 cm, but appears more homogeneous when compared to the previous ultrasound of 10/11/2021. Complex intrauterine contents have resolved.  2.  Hemorrhagic cyst in the left ovary, measuring up to 3.4 cm. Just adjacent to or arising from the lateral aspect of the left ovary, there is another complex lesion measuring up to 3.4 cm, which is indeterminate but similar to the previous study. Considerations include collapsed corpus luteal cyst versus sequelae of ectopic pregnancy. Continued surveillance of the beta HCG trend is recommended. Follow-up ultrasound is recommended in 6-8 weeks. 3.  Small amount of free fluid in the pelvic cul-de-sac and left adnexa. No ascites in the upper abdomen. 4.  No evidence of ovarian torsion. EKG      All EKG's are interpreted by the Emergency Department Physicianwho either signs or Co-signs this chart in the absence of a cardiologist.    EMERGENCY DEPARTMENT COURSE:  ED Course as of 11/30/21 0747   Mon Nov 29, 2021   1212 OB evaluated patient, they recommended for patient to be discharged and follow-up with Dr. Benito Antunez.  Her quant today is 13, previously it was 171. Hemoglobin stable at 12.4. Ultrasound demonstrates a hemorrhagic cyst. [AN]      ED Course User Index  [AN] Faith Fall MD          PROCEDURES:  None    CONSULTS:  IP CONSULT TO OB GYN    CRITICAL CARE:      FINAL IMPRESSION      1. Hemorrhagic ovarian cyst    2. History of ectopic pregnancy    3. Ectopic pregnancy, unspecified location, unspecified whether intrauterine pregnancy present    4. H/O methotrexate therapy    5. Pelvic pain    6. Pregnancy of unknown anatomic location    7.  History of ectopic pregnancy x2          DISPOSITION / PLAN     DISPOSITION Decision To Discharge 11/29/2021 12:07:02 PM      PATIENT REFERRED TO:  [facility]  [address] [facility] [address]  [phone]    If symptoms worsen    Jennifer Wiley MD  [facility]  [phone]      As needed      DISCHARGE MEDICATIONS:  Discharge Medication List as of 11/29/2021 12:29 PM          Faith Fall MD  Emergency Medicine Resident    (Please note that portions of this note were completed with a voice recognition program.Efforts were made to edit the dictations but occasionally words are mis-transcribed.)        Garry Valadez MD  Resident  11/30/21 2648

## 2021-11-29 NOTE — ED NOTES
Pt states sent to ED to rule out tubal rupture  Pt states five  weeks ago came to the ED and was told  pregnancy was not uterine  Pt was given methotrexate  Pt states abdominal pain is a 5.        Padma Pole, LPN  16/21/72 9089

## 2021-11-29 NOTE — ED PROVIDER NOTES
[facility]     Emergency Department     Faculty Attestation    I performed a history and physical examination of the patient and discussed management with the resident. I have reviewed and agree with the residents findings including all diagnostic interpretations, and treatment plans as written at the time of my review. Any areas of disagreement are noted on the chart. I was personally present for the key portions of any procedures. I have documented in the chart those procedures where I was not present during the key portions. For Physician Assistant/ Nurse Practitioner cases/documentation I have personally evaluated this patient and have completed at least one if not all key elements of the E/M (history, physical exam, and MDM). Additional findings are as noted. This patient was evaluated in the Emergency Department for symptoms described in the history of present illness. The patient was evaluated in the context of the global COVID-19 pandemic, which necessitated consideration that the patient might be at risk for infection with the SARS-CoV-2 virus that causes COVID-19. Institutional protocols and algorithms that pertain to the evaluation of patients at risk for COVID-19 are in a state of rapid change based on information released by regulatory bodies including the CDC and federal and state organizations. These policies and algorithms were followed during the patient's care in the ED. Primary Care Physician: Bob Gaines MD    History: This is a 36 y.o. female who presents to the Emergency Department with complaint of vaginal bleeding and lower abdominal pain and cramping. The patient was treated for an ectopic pregnancy in October. She had been having some decrease quantitative beta hCGs however her last result on November 3 was 171.   She contacted her OB/GYN with the worsening the pain was told to come the emergency department for further evaluation and treatment. Physical:   weight is 125 lb (56.7 kg). Her temperature is 97.9 °F (36.6 °C). Her blood pressure is 135/84 and her pulse is 104. Her respiration is 16 and oxygen saturation is 98%. Abdomen soft she does have some mild tenderness in the lower abdominal areas no guarding or rebound, pelvic per the resident. Impression: Lower abdominal pain, history of ectopic pregnancy    Plan: Quantitative beta-hCG, CBC, BMP, pelvic ultrasound, OB consultation      (Please note that portions of this note were completed with a voice recognition program.  Efforts were made to edit the dictations but occasionally words are mis-transcribed.)    Thomas Craven.  Kerwin Pinzon MD, [facility]  Attending Emergency Medicine Physician        Farhan Crowell MD  11/29/21 5899

## 2021-11-29 NOTE — ED NOTES
Pt came into er in nad   Pt reports hx of ectopic preg and has been having abd pain with bleeding x1 day   Pt denies n/vb   Pt alert and oriented x4     Emmanuelle Ivy RN  11/29/21 5749

## 2021-11-30 ASSESSMENT — ENCOUNTER SYMPTOMS
STRIDOR: 0
SHORTNESS OF BREATH: 0
ABDOMINAL PAIN: 0
NAUSEA: 0
VOMITING: 0

## 2021-12-07 ENCOUNTER — HOSPITAL ENCOUNTER (OUTPATIENT)
Age: 31
Discharge: HOME | End: 2021-12-07
Payer: OTHER GOVERNMENT

## 2021-12-07 DIAGNOSIS — Z87.59 HISTORY OF ECTOPIC PREGNANCY: ICD-10-CM

## 2021-12-07 DIAGNOSIS — O36.80X0 PREGNANCY OF UNKNOWN ANATOMIC LOCATION: ICD-10-CM

## 2021-12-07 DIAGNOSIS — O00.90 ECTOPIC PREGNANCY, UNSPECIFIED LOCATION, UNSPECIFIED WHETHER INTRAUTERINE PREGNANCY PRESENT: ICD-10-CM

## 2021-12-07 LAB — HCG QUANTITATIVE: 5 IU/L

## 2021-12-07 PROCEDURE — 36415 COLL VENOUS BLD VENIPUNCTURE: CPT

## 2021-12-07 PROCEDURE — 84702 CHORIONIC GONADOTROPIN TEST: CPT

## 2021-12-08 ENCOUNTER — OFFICE VISIT (OUTPATIENT)
Dept: OBGYN CLINIC | Age: 31
End: 2021-12-08
Payer: OTHER GOVERNMENT

## 2021-12-08 VITALS — SYSTOLIC BLOOD PRESSURE: 110 MMHG | DIASTOLIC BLOOD PRESSURE: 62 MMHG | BODY MASS INDEX: 22.13 KG/M2 | WEIGHT: 121 LBS

## 2021-12-08 DIAGNOSIS — Z87.59 HISTORY OF ECTOPIC PREGNANCY: Primary | ICD-10-CM

## 2021-12-08 DIAGNOSIS — R10.2 PELVIC PAIN: ICD-10-CM

## 2021-12-08 PROCEDURE — 99213 OFFICE O/P EST LOW 20 MIN: CPT | Performed by: OBSTETRICS & GYNECOLOGY

## 2021-12-08 NOTE — PROGRESS NOTES
Elida Rojas  2021    YOB: 1990    The patient was seen today. She is here regarding ectopic pregnancy follow up . Her bowels are regular and she is voiding without difficulty. HPI:  Alvino Mckeon is a 36 y.o. female      Pt. Had hCG drawn yesterday and showed 5. She had TVUS done  which did show complex left adnexal cystic structure. She denies pain today, but she is bleeding daily with clots. She states she is saturating a pad every 2-3 hours. Denies F/CH. She denies systemic signs of anemia at this time. She is grieving appropriately and is very upset, but states that she is doing well and just wants this to be over. Discussed OCP or hormonal management but ultimately decided to repeat ultrasound and consider MRI if surgery is likely. OB History    Para Term  AB Living   1 0 0 0 1 0   SAB IAB Ectopic Molar Multiple Live Births   0 0 1 0 0 0      # Outcome Date GA Lbr Slava/2nd Weight Sex Delivery Anes PTL Lv   1 Ectopic 2020              Birth Comments: Right salpingectomy       Past Medical History:   Diagnosis Date    ADHD     Ectopic pregnancy     Migraines        Past Surgical History:   Procedure Laterality Date    BREAST LUMPECTOMY Right     BREAST SURGERY Left 10/28/2020    LEFT BREAST MASS WIDE EXCISION W ULTRASOUND BY  performed by Ramon Coates MD at [address] [place] [address]      right tube removed    LYMPH NODE DISSECTION Left        No family history on file.     Social History     Socioeconomic History    Marital status:      Spouse name: Not on file    Number of children: Not on file    Years of education: Not on file    Highest education level: Not on file   Occupational History    Not on file   Tobacco Use    Smoking status: Current Every Day Smoker     Packs/day: 0.25    Smokeless tobacco: Never Used   Vaping Use    Vaping Use: Never used   Substance and Sexual Activity    facility-administered medications for this visit. ALLERGIES:  Allergies as of 12/08/2021 - Fully Reviewed 12/08/2021   Allergen Reaction Noted    Azithromycin Nausea And Vomiting 11/30/2016    Ibuprofen Swelling 11/30/2016    Sulfa antibiotics Nausea And Vomiting and Rash 10/28/2020         REVIEW OF SYSTEMS:       A minimum of an eleven point review of systems was completed. Review Of Systems (11 point):  Constitutional: No fever, chills or malaise; No weight change or fatigue  Head and Eyes: No vision, Headache, Dizziness or trauma in last 12 months  ENT ROS: No hearing, Tinnitis, sinus or taste problems  Hematological and Lymphatic ROS:No Lymphoma, Von Willebrand's, Hemophillia or Bleeding History  Psych ROS: No Depression, Homicidal thoughts,suicidal thoughts, or anxiety  Breast ROS: No prior breast abnormalities or lumps  Respiratory ROS: No SOB, Pneumoniae,Cough, or Pulmonary Embolism History  Cardiovascular ROS: No Chest Pain with Exertion, Palpitations, Syncope, Edema, Arrhythmia  Gastrointestinal ROS: No Indigestion, Heartburn, Nausea, vomiting, Diarrhea, Constipation,or Bowel Changes; No Bloody Stools or melena  Genito-Urinary ROS: No Dysuria, Hematuria or Nocturia. No Urinary Incontinence or Vaginal Discharge +persistent vaginal bleeding  Musculoskeletal ROS: No Arthralgia, Arthritis,Gout,Osteoporosis or Rheumatism  Neurological ROS: No CVA, Migraines, Epilepsy, Seizure Hx, or Limb Weakness  Dermatological ROS: No Rash, Itching, Hives, Mole Changes or Cancer          Blood pressure 110/62, weight 121 lb (54.9 kg), not currently breastfeeding. Abdomen: Soft non-tender; good bowel sounds. No guarding, rebound or rigidity. No CVA tenderness bilaterally. Extremities: No calf tenderness, DTR 2/4, and No edema bilaterally    Pelvic: Exam deferred.     Diagnostics:  US NON OB TRANSVAGINAL    Result Date: 11/30/2021  EXAMINATION: PELVIC ULTRASOUND; LIMITED ABDOMINAL ULTRASOUND 11/29/2021 TECHNIQUE: Transabdominal and transvaginal pelvic duplex ultrasound using B-mode/gray scaled imaging, Doppler spectral analysis and color flow Doppler was obtained. COMPARISON: Ultrasound dated 10/11/2021 HISTORY: ORDERING SYSTEM PROVIDED HISTORY: rule out ectopic, s/p methotrexate, vaginal bleeding TECHNOLOGIST PROVIDED HISTORY: Rule out ectopic, s/p methotrexate, vaginal bleeding Methotrexate in October 2021;  Beta HCG of 15 on 11/29/2021 (171 on 11/03/2021) FINDINGS: Measurements: Uterus: 7.0 x 3.9 x 5.1 cm Endometrial stripe: 1.6 cm Right Ovary:3.4 x 2.3 x 2.5 cm Left Ovary: 4.8 x 3.0 x 3.0 cm Ultrasound Findings: Uterus: Uterus demonstrates normal myometrial echotexture. Endometrial stripe: Thickness of the endometrial stripe is at the upper limits of normal.  Complex fluid previously seen within the uterine cavity is improved. Right Ovary: Right ovary is within normal limits. Color Doppler flow is demonstrated. Left Ovary: There is a complex, likely hemorrhagic cyst in the left ovary, measuring 3.4 x 2.6 x 2.8 cm. There is another complex thick-walled cystic lesion adjacent to or arising from the lateral aspect of the left ovary, measuring 3.4 x 2.0 x 2.1 cm. Color Doppler flow demonstrated in the ovary. Free Fluid: There is a small amount of free fluid in the pelvic cul-de-sac and left adnexa. Limited abdominal ultrasound: Targeted ultrasound imaging was performed in the upper abdomen to assess for ascites. 1.  Endometrial stripe thickness is at the upper limits of normal, measuring 1.6 cm, but appears more homogeneous when compared to the previous ultrasound of 10/11/2021. Complex intrauterine contents have resolved. 2.  Hemorrhagic cyst in the left ovary, measuring up to 3.4 cm. Just adjacent to or arising from the lateral aspect of the left ovary, there is another complex lesion measuring up to 3.4 cm, which is indeterminate but similar to the previous study.   Considerations include collapsed corpus luteal cyst versus sequelae of ectopic pregnancy. Continued surveillance of the beta HCG trend is recommended. Follow-up ultrasound is recommended in 6-8 weeks. 3.  Small amount of free fluid in the pelvic cul-de-sac and left adnexa. No ascites in the upper abdomen. 4.  No evidence of ovarian torsion. US ABDOMEN LIMITED    Result Date: 11/30/2021  EXAMINATION: PELVIC ULTRASOUND; LIMITED ABDOMINAL ULTRASOUND 11/29/2021 TECHNIQUE: Transabdominal and transvaginal pelvic duplex ultrasound using B-mode/gray scaled imaging, Doppler spectral analysis and color flow Doppler was obtained. COMPARISON: Ultrasound dated 10/11/2021 HISTORY: ORDERING SYSTEM PROVIDED HISTORY: rule out ectopic, s/p methotrexate, vaginal bleeding TECHNOLOGIST PROVIDED HISTORY: Rule out ectopic, s/p methotrexate, vaginal bleeding Methotrexate in October 2021;  Beta HCG of 15 on 11/29/2021 (171 on 11/03/2021) FINDINGS: Measurements: Uterus: 7.0 x 3.9 x 5.1 cm Endometrial stripe: 1.6 cm Right Ovary:3.4 x 2.3 x 2.5 cm Left Ovary: 4.8 x 3.0 x 3.0 cm Ultrasound Findings: Uterus: Uterus demonstrates normal myometrial echotexture. Endometrial stripe: Thickness of the endometrial stripe is at the upper limits of normal.  Complex fluid previously seen within the uterine cavity is improved. Right Ovary: Right ovary is within normal limits. Color Doppler flow is demonstrated. Left Ovary: There is a complex, likely hemorrhagic cyst in the left ovary, measuring 3.4 x 2.6 x 2.8 cm. There is another complex thick-walled cystic lesion adjacent to or arising from the lateral aspect of the left ovary, measuring 3.4 x 2.0 x 2.1 cm. Color Doppler flow demonstrated in the ovary. Free Fluid: There is a small amount of free fluid in the pelvic cul-de-sac and left adnexa. Limited abdominal ultrasound: Targeted ultrasound imaging was performed in the upper abdomen to assess for ascites.      1.  Endometrial stripe thickness is at the upper limits of normal, measuring 1.6 cm, but appears more homogeneous when compared to the previous ultrasound of 10/11/2021. Complex intrauterine contents have resolved. 2.  Hemorrhagic cyst in the left ovary, measuring up to 3.4 cm. Just adjacent to or arising from the lateral aspect of the left ovary, there is another complex lesion measuring up to 3.4 cm, which is indeterminate but similar to the previous study. Considerations include collapsed corpus luteal cyst versus sequelae of ectopic pregnancy. Continued surveillance of the beta HCG trend is recommended. Follow-up ultrasound is recommended in 6-8 weeks. 3.  Small amount of free fluid in the pelvic cul-de-sac and left adnexa. No ascites in the upper abdomen. 4.  No evidence of ovarian torsion. Lab Results:  Results for orders placed or performed during the hospital encounter of 12/07/21   hCG, Quantitative, Pregnancy   Result Value Ref Range    hCG Quant 5 (H) <5 IU/L         Assessment:   Diagnosis Orders   1. History of ectopic pregnancy  [address] [place] [address] OB TRANSVAGINAL     Patient Active Problem List    Diagnosis Date Noted    History of ectopic pregnancy x2 10/09/2021     G1- R salpingectomy 2020  G2- Methotrexate      Pregnancy of unknown anatomic location 10/09/2021     10/11/21: Methotrexate given. 10/18/21: 2nd beta Hcg showed a 15% drop. Will order Beta Hcg weekly until <5. Patient aware   11/29/21: quant 15          PLAN:  No follow-ups on file. TVUS  Consider Hscope, D&C, laparoscopy if problems continue. HCG is 5 and no systemic signs of infection. However, she does have history of salpingectomy and attempt being made to preserve left fallopian tube  Repeat Annual every 1 year  Cervical Cytology Evaluation begins at 26years old. If Negative Cytology, Follow-up screening per current guidelines. Return to the office in 1 weeks. Counseled on preventative health maintenance follow-up.   Orders Placed This Encounter   Procedures    US PELVIS COMPLETE     This procedure can be scheduled via [employer]. Access your [employer] account by visiting [email or web address]. Standing Status:   Future     Standing Expiration Date:   12/8/2022     NON OB TRANSVAGINAL     This procedure can be scheduled via [employer]. Access your [employer] account by visiting [email or web address]. Standing Status:   Future     Standing Expiration Date:   12/8/2022           The patient, Jake Wade is a 36 y.o. female, was seen with a total time spent of 20 minutes for the visit on this date of service by the E/M provider. The time component had both face to face and non face to face time spent in determining the total time component. Counseling and education regarding her diagnosis listed below and her options regarding those diagnoses were also included in determining her time component. Diagnosis Orders   1. History of ectopic pregnancy  [address] [place] [address] NON OB TRANSVAGINAL        The patient had her preventative health maintenance recommendations and follow-up reviewed with her at the completion of her visit.

## 2021-12-09 ENCOUNTER — HOSPITAL ENCOUNTER (OUTPATIENT)
Dept: ULTRASOUND IMAGING | Age: 31
Discharge: HOME | End: 2021-12-11
Payer: OTHER GOVERNMENT

## 2021-12-09 DIAGNOSIS — R10.2 PELVIC PAIN: ICD-10-CM

## 2021-12-09 DIAGNOSIS — Z87.59 HISTORY OF ECTOPIC PREGNANCY: ICD-10-CM

## 2021-12-09 PROCEDURE — 76856 US EXAM PELVIC COMPLETE: CPT

## 2021-12-09 PROCEDURE — 76830 TRANSVAGINAL US NON-OB: CPT

## 2021-12-14 ENCOUNTER — TELEMEDICINE (OUTPATIENT)
Dept: OBGYN CLINIC | Age: 31
End: 2021-12-14
Payer: OTHER GOVERNMENT

## 2021-12-14 DIAGNOSIS — O00.90 ECTOPIC PREGNANCY, UNSPECIFIED LOCATION, UNSPECIFIED WHETHER INTRAUTERINE PREGNANCY PRESENT: Primary | ICD-10-CM

## 2021-12-14 DIAGNOSIS — Z92.21 H/O METHOTREXATE THERAPY: ICD-10-CM

## 2021-12-14 PROCEDURE — 99213 OFFICE O/P EST LOW 20 MIN: CPT | Performed by: OBSTETRICS & GYNECOLOGY

## 2021-12-14 NOTE — PROGRESS NOTES
Dianne Rojas  2021    Marianaparish Rojas (:  1990) has requested an audio/video evaluation for the following concern(s):    1. Ectopic pregnancy, unspecified location, unspecified whether intrauterine pregnancy present    2. H/O methotrexate therapy        TELEHEALTH EVALUATION -- Audio/Visual (During [id number] public health emergency)      Jake Wade is a 36 y.o. female       She was here to follow-up regarding her labs and diagnostics ordered at her last visit for the diagnosis of:    ICD-10-CM    1. Ectopic pregnancy, unspecified location, unspecified whether intrauterine pregnancy present  O00.90    2. H/O methotrexate therapy  Z92.21        She does not have any specific chief complaint today. Her bowels are regular and she is voiding without difficulty. She states that bleeding has slowed and she is not having any discomfort. She will let us know if she is having any concerns, but anticipate normal cycling shortly. Past Medical History:   Diagnosis Date    ADHD     Ectopic pregnancy     Migraines          Past Surgical History:   Procedure Laterality Date    BREAST LUMPECTOMY Right     BREAST SURGERY Left 10/28/2020    LEFT BREAST MASS WIDE EXCISION W ULTRASOUND BY  performed by Facundo Perez MD at [address] [place] [address]      right tube removed    LYMPH NODE DISSECTION Left          No family history on file. Social History     Tobacco Use    Smoking status: Current Every Day Smoker     Packs/day: 0.25    Smokeless tobacco: Never Used   Vaping Use    Vaping Use: Never used   Substance Use Topics    Alcohol use:  Yes    Drug use: Not Currently         MEDICATIONS:  Current Outpatient Medications   Medication Sig Dispense Refill    sennosides-docusate sodium (SENOKOT-S) 8.6-50 MG tablet Take 1 tablet by mouth 2 times daily 30 tablet 0    levothyroxine (SYNTHROID) 50 MCG tablet Take 1 tablet by mouth Daily 30 tablet 3    amphetamine-dextroamphetamine (ADDERALL XR) 15 MG extended release capsule Take 15 mg by mouth 2 times daily.  SUMAtriptan (IMITREX) 50 MG tablet Take 50 mg by mouth once as needed for Migraine       No current facility-administered medications for this visit. ALLERGIES:  Allergies as of 12/14/2021 - Fully Reviewed 12/08/2021   Allergen Reaction Noted    Azithromycin Nausea And Vomiting 11/30/2016    Ibuprofen Swelling 11/30/2016    Sulfa antibiotics Nausea And Vomiting and Rash 10/28/2020         not currently breastfeeding. PE is limited due to the virtual visit    Abdomen: Soft non-tender; good bowel sounds. No guarding, rebound or rigidity. No CVA tenderness bilaterally reported when questioned. (Viewed Virtually)    Extremities: No calf tenderness, DTR 2/4, and No edema bilaterally as inspected by video and palpation by the patient (Viewed Virtually)    Pelvic: (Virtual Visit-Not Completed)    Diagnostics:  US NON OB TRANSVAGINAL    Result Date: 12/9/2021  EXAMINATION: PELVIC ULTRASOUND 12/9/2021 TECHNIQUE: Transabdominal and transvaginal pelvic duplex ultrasound using B-mode/gray scaled imaging, Doppler spectral analysis and color flow Doppler was obtained. COMPARISON: Pelvis ultrasound 11/29/2021 HISTORY: ORDERING SYSTEM PROVIDED HISTORY: History of ectopic pregnancy, irregular menses, serum beta HCG 5 on 12/07/2021 FINDINGS: Measurements: Uterus: 5.9 x 4.8 x 3.4 cm Endometrial stripe: 4 mm thickness Right Ovary: 2.9 x 2.1 x 1.8 cm Left Ovary: 2.9 x 1.9 x 1.8 cm Ultrasound Findings: Uterus: Uterus demonstrates normal myometrial echotexture. Mild arcuate configuration of the uterus. Endometrial stripe: Endometrial stripe is within normal limits. Right Ovary: Right ovary is within normal limits. There is normal arterial and venous Doppler flow. Left Ovary:  Left ovary is within normal limits. The left adnexal cystic area described previously has resolved.   There is normal arterial and venous Doppler flow. Free Fluid: Small volume cul-de-sac fluid. Unremarkable pelvic ultrasound. No intrauterine or ectopic pregnancy demonstrated. Normal Doppler flow within the ovaries. US NON OB TRANSVAGINAL    Result Date: 11/30/2021  EXAMINATION: PELVIC ULTRASOUND; LIMITED ABDOMINAL ULTRASOUND 11/29/2021 TECHNIQUE: Transabdominal and transvaginal pelvic duplex ultrasound using B-mode/gray scaled imaging, Doppler spectral analysis and color flow Doppler was obtained. COMPARISON: Ultrasound dated 10/11/2021 HISTORY: ORDERING SYSTEM PROVIDED HISTORY: rule out ectopic, s/p methotrexate, vaginal bleeding TECHNOLOGIST PROVIDED HISTORY: Rule out ectopic, s/p methotrexate, vaginal bleeding Methotrexate in October 2021;  Beta HCG of 15 on 11/29/2021 (171 on 11/03/2021) FINDINGS: Measurements: Uterus: 7.0 x 3.9 x 5.1 cm Endometrial stripe: 1.6 cm Right Ovary:3.4 x 2.3 x 2.5 cm Left Ovary: 4.8 x 3.0 x 3.0 cm Ultrasound Findings: Uterus: Uterus demonstrates normal myometrial echotexture. Endometrial stripe: Thickness of the endometrial stripe is at the upper limits of normal.  Complex fluid previously seen within the uterine cavity is improved. Right Ovary: Right ovary is within normal limits. Color Doppler flow is demonstrated. Left Ovary: There is a complex, likely hemorrhagic cyst in the left ovary, measuring 3.4 x 2.6 x 2.8 cm. There is another complex thick-walled cystic lesion adjacent to or arising from the lateral aspect of the left ovary, measuring 3.4 x 2.0 x 2.1 cm. Color Doppler flow demonstrated in the ovary. Free Fluid: There is a small amount of free fluid in the pelvic cul-de-sac and left adnexa. Limited abdominal ultrasound: Targeted ultrasound imaging was performed in the upper abdomen to assess for ascites. 1.  Endometrial stripe thickness is at the upper limits of normal, measuring 1.6 cm, but appears more homogeneous when compared to the previous ultrasound of 10/11/2021.   Complex intrauterine contents have resolved. 2.  Hemorrhagic cyst in the left ovary, measuring up to 3.4 cm. Just adjacent to or arising from the lateral aspect of the left ovary, there is another complex lesion measuring up to 3.4 cm, which is indeterminate but similar to the previous study. Considerations include collapsed corpus luteal cyst versus sequelae of ectopic pregnancy. Continued surveillance of the beta HCG trend is recommended. Follow-up ultrasound is recommended in 6-8 weeks. 3.  Small amount of free fluid in the pelvic cul-de-sac and left adnexa. No ascites in the upper abdomen. 4.  No evidence of ovarian torsion. US PELVIS COMPLETE    Result Date: 12/9/2021  EXAMINATION: PELVIC ULTRASOUND 12/9/2021 TECHNIQUE: Transabdominal and transvaginal pelvic duplex ultrasound using B-mode/gray scaled imaging, Doppler spectral analysis and color flow Doppler was obtained. COMPARISON: Pelvis ultrasound 11/29/2021 HISTORY: ORDERING SYSTEM PROVIDED HISTORY: History of ectopic pregnancy, irregular menses, serum beta HCG 5 on 12/07/2021 FINDINGS: Measurements: Uterus: 5.9 x 4.8 x 3.4 cm Endometrial stripe: 4 mm thickness Right Ovary: 2.9 x 2.1 x 1.8 cm Left Ovary: 2.9 x 1.9 x 1.8 cm Ultrasound Findings: Uterus: Uterus demonstrates normal myometrial echotexture. Mild arcuate configuration of the uterus. Endometrial stripe: Endometrial stripe is within normal limits. Right Ovary: Right ovary is within normal limits. There is normal arterial and venous Doppler flow. Left Ovary:  Left ovary is within normal limits. The left adnexal cystic area described previously has resolved. There is normal arterial and venous Doppler flow. Free Fluid: Small volume cul-de-sac fluid. Unremarkable pelvic ultrasound. No intrauterine or ectopic pregnancy demonstrated. Normal Doppler flow within the ovaries.      US ABDOMEN LIMITED    Result Date: 11/30/2021  EXAMINATION: PELVIC ULTRASOUND; LIMITED ABDOMINAL ULTRASOUND 11/29/2021 TECHNIQUE: Transabdominal and transvaginal pelvic duplex ultrasound using B-mode/gray scaled imaging, Doppler spectral analysis and color flow Doppler was obtained. COMPARISON: Ultrasound dated 10/11/2021 HISTORY: ORDERING SYSTEM PROVIDED HISTORY: rule out ectopic, s/p methotrexate, vaginal bleeding TECHNOLOGIST PROVIDED HISTORY: Rule out ectopic, s/p methotrexate, vaginal bleeding Methotrexate in October 2021;  Beta HCG of 15 on 11/29/2021 (171 on 11/03/2021) FINDINGS: Measurements: Uterus: 7.0 x 3.9 x 5.1 cm Endometrial stripe: 1.6 cm Right Ovary:3.4 x 2.3 x 2.5 cm Left Ovary: 4.8 x 3.0 x 3.0 cm Ultrasound Findings: Uterus: Uterus demonstrates normal myometrial echotexture. Endometrial stripe: Thickness of the endometrial stripe is at the upper limits of normal.  Complex fluid previously seen within the uterine cavity is improved. Right Ovary: Right ovary is within normal limits. Color Doppler flow is demonstrated. Left Ovary: There is a complex, likely hemorrhagic cyst in the left ovary, measuring 3.4 x 2.6 x 2.8 cm. There is another complex thick-walled cystic lesion adjacent to or arising from the lateral aspect of the left ovary, measuring 3.4 x 2.0 x 2.1 cm. Color Doppler flow demonstrated in the ovary. Free Fluid: There is a small amount of free fluid in the pelvic cul-de-sac and left adnexa. Limited abdominal ultrasound: Targeted ultrasound imaging was performed in the upper abdomen to assess for ascites. 1.  Endometrial stripe thickness is at the upper limits of normal, measuring 1.6 cm, but appears more homogeneous when compared to the previous ultrasound of 10/11/2021. Complex intrauterine contents have resolved. 2.  Hemorrhagic cyst in the left ovary, measuring up to 3.4 cm. Just adjacent to or arising from the lateral aspect of the left ovary, there is another complex lesion measuring up to 3.4 cm, which is indeterminate but similar to the previous study.   Considerations include collapsed corpus luteal cyst versus sequelae of ectopic pregnancy. Continued surveillance of the beta HCG trend is recommended. Follow-up ultrasound is recommended in 6-8 weeks. 3.  Small amount of free fluid in the pelvic cul-de-sac and left adnexa. No ascites in the upper abdomen. 4.  No evidence of ovarian torsion. Lab Results:  Results for orders placed or performed during the hospital encounter of 12/07/21   hCG, Quantitative, Pregnancy   Result Value Ref Range    hCG Quant 5 (H) <5 IU/L           Assessment:   Diagnosis Orders   1. Ectopic pregnancy, unspecified location, unspecified whether intrauterine pregnancy present     2. H/O methotrexate therapy       No chief complaint on file. Patient Active Problem List    Diagnosis Date Noted    History of ectopic pregnancy x2 10/09/2021     G1- R salpingectomy 2020  G2- Methotrexate      Pregnancy of unknown anatomic location 10/09/2021     10/11/21: Methotrexate given. 10/18/21: 2nd beta Hcg showed a 15% drop. Will order Beta Hcg weekly until <5. Patient aware   11/29/21: Angi Sebastian 15          PLAN:  Return if symptoms worsen or fail to improve, for annual.  Counseled on preventative health maintenance follow-up. No orders of the defined types were placed in this encounter. Prachi Blanchard is a 36 y.o. female female was evaluated by a Virtual Visit (video visit) encounter to address concerns as mentioned above. A caregiver was present when appropriate. Due to this being a TeleHealth encounter (During [facility]- public health emergency), evaluation of the following organ systems was limited: Vitals/Constitutional/EENT/Resp/CV/GI//MS/Neuro/Skin/Heme-Lymph-Imm.   Pursuant to the emergency declaration under the [facility], [facility]5 waiver authority and the [employer] Act, this Virtual Visit was conducted with patient's (and/or legal guardian's) consent, to reduce the patient's risk of exposure to COVID-19 and provide necessary medical care. The patient (and/or legal guardian) has also been advised to contact this office for worsening conditions or problems, and seek emergency medical treatment and/or call 911 if deemed necessary. Services were provided through a video synchronous discussion virtually to substitute for in-person clinic visit. Patient and provider were located at their individual homes. Electronically signed by Meaghan Griffiths DO on 12/14/21 at 2:01 PM EST     An electronic signature was used to authenticate this note. The Virtual Visit time of 20 minutes. More than 50% of this visit was counseling and education regarding The primary encounter diagnosis was Ectopic pregnancy, unspecified location, unspecified whether intrauterine pregnancy present. A diagnosis of H/O methotrexate therapy was also pertinent to this visit. and No chief complaint on file. as well as  counseling on preventative health maintenance follow-up.

## 2022-06-17 ENCOUNTER — HOSPITAL ENCOUNTER (OUTPATIENT)
Age: 32
Setting detail: SPECIMEN
Discharge: HOME | End: 2022-06-17
Payer: OTHER GOVERNMENT

## 2022-06-17 LAB — HCG QUANTITATIVE: <1 MIU/ML

## 2022-06-17 PROCEDURE — 84702 CHORIONIC GONADOTROPIN TEST: CPT

## 2022-06-17 PROCEDURE — 36415 COLL VENOUS BLD VENIPUNCTURE: CPT

## 2022-09-06 ENCOUNTER — APPOINTMENT (OUTPATIENT)
Dept: GENERAL RADIOLOGY | Age: 32
End: 2022-09-06
Payer: OTHER GOVERNMENT

## 2022-09-06 ENCOUNTER — HOSPITAL ENCOUNTER (EMERGENCY)
Age: 32
Discharge: HOME | End: 2022-09-07
Attending: EMERGENCY MEDICINE
Payer: OTHER GOVERNMENT

## 2022-09-06 VITALS
DIASTOLIC BLOOD PRESSURE: 70 MMHG | TEMPERATURE: 98 F | WEIGHT: 125 LBS | RESPIRATION RATE: 16 BRPM | HEART RATE: 93 BPM | BODY MASS INDEX: 23 KG/M2 | HEIGHT: 62 IN | SYSTOLIC BLOOD PRESSURE: 113 MMHG | OXYGEN SATURATION: 99 %

## 2022-09-06 DIAGNOSIS — M25.512 ACUTE PAIN OF LEFT SHOULDER: Primary | ICD-10-CM

## 2022-09-06 LAB
ABSOLUTE EOS #: 0.18 K/UL (ref 0–0.44)
ABSOLUTE IMMATURE GRANULOCYTE: 0.02 K/UL (ref 0–0.3)
ABSOLUTE LYMPH #: 3 K/UL (ref 1.1–3.7)
ABSOLUTE MONO #: 0.79 K/UL (ref 0.1–1.2)
ALBUMIN SERPL-MCNC: 4 G/DL (ref 3.5–5.2)
ALP BLD-CCNC: 64 U/L (ref 35–104)
ALT SERPL-CCNC: 12 U/L (ref 5–33)
ANION GAP SERPL CALCULATED.3IONS-SCNC: 12 MMOL/L (ref 9–17)
AST SERPL-CCNC: 20 U/L
BASOPHILS # BLD: 1 % (ref 0–2)
BASOPHILS ABSOLUTE: 0.05 K/UL (ref 0–0.2)
BILIRUB SERPL-MCNC: 0.4 MG/DL (ref 0.3–1.2)
BILIRUBIN DIRECT: <0.1 MG/DL
BILIRUBIN, INDIRECT: NORMAL MG/DL (ref 0–1)
BUN BLDV-MCNC: 10 MG/DL (ref 6–20)
BUN/CREAT BLD: 18 (ref 9–20)
CALCIUM SERPL-MCNC: 9.5 MG/DL (ref 8.6–10.4)
CHLORIDE BLD-SCNC: 103 MMOL/L (ref 98–107)
CO2: 21 MMOL/L (ref 20–31)
CREAT SERPL-MCNC: 0.55 MG/DL (ref 0.5–0.9)
D-DIMER QUANTITATIVE: <0.27 MG/L FEU (ref 0–0.59)
EOSINOPHILS RELATIVE PERCENT: 2 % (ref 1–4)
GFR AFRICAN AMERICAN: >60 ML/MIN
GFR NON-AFRICAN AMERICAN: >60 ML/MIN
GFR SERPL CREATININE-BSD FRML MDRD: NORMAL ML/MIN/{1.73_M2}
GLUCOSE BLD-MCNC: 87 MG/DL (ref 70–99)
HCT VFR BLD CALC: 40.4 % (ref 36.3–47.1)
HEMOGLOBIN: 12.6 G/DL (ref 11.9–15.1)
IMMATURE GRANULOCYTES: 0 %
LYMPHOCYTES # BLD: 31 % (ref 24–43)
MCH RBC QN AUTO: 30 PG (ref 25.2–33.5)
MCHC RBC AUTO-ENTMCNC: 31.2 G/DL (ref 28.4–34.8)
MCV RBC AUTO: 96.2 FL (ref 82.6–102.9)
MONOCYTES # BLD: 8 % (ref 3–12)
MYOGLOBIN: <21 NG/ML (ref 25–58)
NRBC AUTOMATED: 0 PER 100 WBC
PDW BLD-RTO: 12.2 % (ref 11.8–14.4)
PLATELET # BLD: 284 K/UL (ref 138–453)
PMV BLD AUTO: 10.1 FL (ref 8.1–13.5)
POTASSIUM SERPL-SCNC: 4.5 MMOL/L (ref 3.7–5.3)
RBC # BLD: 4.2 M/UL (ref 3.95–5.11)
SEG NEUTROPHILS: 58 % (ref 36–65)
SEGMENTED NEUTROPHILS ABSOLUTE COUNT: 5.7 K/UL (ref 1.5–8.1)
SODIUM BLD-SCNC: 136 MMOL/L (ref 135–144)
TOTAL PROTEIN: 8 G/DL (ref 6.4–8.3)
TROPONIN, HIGH SENSITIVITY: <6 NG/L (ref 0–14)
WBC # BLD: 9.7 K/UL (ref 3.5–11.3)

## 2022-09-06 PROCEDURE — 84484 ASSAY OF TROPONIN QUANT: CPT

## 2022-09-06 PROCEDURE — 80076 HEPATIC FUNCTION PANEL: CPT

## 2022-09-06 PROCEDURE — 85379 FIBRIN DEGRADATION QUANT: CPT

## 2022-09-06 PROCEDURE — 99285 EMERGENCY DEPT VISIT HI MDM: CPT

## 2022-09-06 PROCEDURE — 80048 BASIC METABOLIC PNL TOTAL CA: CPT

## 2022-09-06 PROCEDURE — 83874 ASSAY OF MYOGLOBIN: CPT

## 2022-09-06 PROCEDURE — 6360000002 HC RX W HCPCS: Performed by: EMERGENCY MEDICINE

## 2022-09-06 PROCEDURE — 85025 COMPLETE CBC W/AUTO DIFF WBC: CPT

## 2022-09-06 PROCEDURE — 71045 X-RAY EXAM CHEST 1 VIEW: CPT

## 2022-09-06 PROCEDURE — 96375 TX/PRO/DX INJ NEW DRUG ADDON: CPT

## 2022-09-06 PROCEDURE — 96374 THER/PROPH/DIAG INJ IV PUSH: CPT

## 2022-09-06 PROCEDURE — 93005 ELECTROCARDIOGRAM TRACING: CPT | Performed by: EMERGENCY MEDICINE

## 2022-09-06 RX ORDER — MORPHINE SULFATE 4 MG/ML
4 INJECTION, SOLUTION INTRAMUSCULAR; INTRAVENOUS ONCE
Status: COMPLETED | OUTPATIENT
Start: 2022-09-06 | End: 2022-09-06

## 2022-09-06 RX ADMIN — MORPHINE SULFATE 4 MG: 4 INJECTION, SOLUTION INTRAMUSCULAR; INTRAVENOUS at 23:48

## 2022-09-06 ASSESSMENT — PAIN SCALES - GENERAL: PAINLEVEL_OUTOF10: 9

## 2022-09-06 ASSESSMENT — PAIN - FUNCTIONAL ASSESSMENT: PAIN_FUNCTIONAL_ASSESSMENT: 0-10

## 2022-09-07 LAB
EKG ATRIAL RATE: 89 BPM
EKG P AXIS: 66 DEGREES
EKG P-R INTERVAL: 148 MS
EKG Q-T INTERVAL: 344 MS
EKG QRS DURATION: 86 MS
EKG QTC CALCULATION (BAZETT): 418 MS
EKG R AXIS: 73 DEGREES
EKG T AXIS: 60 DEGREES
EKG VENTRICULAR RATE: 89 BPM

## 2022-09-07 PROCEDURE — 93010 ELECTROCARDIOGRAM REPORT: CPT | Performed by: INTERNAL MEDICINE

## 2022-09-07 PROCEDURE — 6360000002 HC RX W HCPCS: Performed by: EMERGENCY MEDICINE

## 2022-09-07 RX ORDER — KETOROLAC TROMETHAMINE 15 MG/ML
15 INJECTION, SOLUTION INTRAMUSCULAR; INTRAVENOUS ONCE
Status: COMPLETED | OUTPATIENT
Start: 2022-09-07 | End: 2022-09-07

## 2022-09-07 RX ORDER — HYDROCODONE BITARTRATE AND ACETAMINOPHEN 5; 325 MG/1; MG/1
1 TABLET ORAL EVERY 6 HOURS PRN
Qty: 8 TABLET | Refills: 0 | Status: SHIPPED | OUTPATIENT
Start: 2022-09-07 | End: 2022-09-12

## 2022-09-07 RX ADMIN — KETOROLAC TROMETHAMINE 15 MG: 15 INJECTION, SOLUTION INTRAMUSCULAR; INTRAVENOUS at 00:49

## 2022-09-07 ASSESSMENT — PAIN SCALES - GENERAL: PAINLEVEL_OUTOF10: 6

## 2022-09-07 NOTE — ED PROVIDER NOTES
[facility]  Emergency Medicine Department    Pt Name: Romina Harden  MRN: [id number]  [place] 1990  Date of evaluation: 9/6/2022  Provider: Severo Pinna, MD    CHIEF COMPLAINT     Chief Complaint   Patient presents with    Chest Pain     Left sided. Radiates to left shoulder. Ongoing for 2 days       HISTORY OF PRESENT ILLNESS  (Location/Symptom, Timing/Onset, Context/Setting,Quality, Duration, Modifying Factors, Severity.)   Romina Harden is a 29 y.o. female who presents to the emergency department complaining of 2 days of pain to the left shoulder blade. She reports it hurts to take a deep breath. That is been ongoing for 3 days. She denies any injuries. She rates her pain as a 9 out of 10. She has been using Motrin, Tylenol, and Flexeril which does not seem to be helping. She has no history of blood clots. She has never had similar pain in the past.    Nursing Notes were reviewed. ALLERGIES     Azithromycin, Ibuprofen, and Sulfa antibiotics    CURRENT MEDICATIONS       Discharge Medication List as of 9/7/2022 12:59 AM        CONTINUE these medications which have NOT CHANGED    Details   sennosides-docusate sodium (SENOKOT-S) 8.6-50 MG tablet Take 1 tablet by mouth 2 times daily, Disp-30 tablet, R-0Normal      levothyroxine (SYNTHROID) 50 MCG tablet Take 1 tablet by mouth Daily, Disp-30 tablet, R-3Normal      amphetamine-dextroamphetamine (ADDERALL XR) 15 MG extended release capsule Take 15 mg by mouth 2 times daily. Historical Med      SUMAtriptan (IMITREX) 50 MG tablet Take 50 mg by mouth once as needed for MigraineHistorical Med             PAST MEDICAL HISTORY         Diagnosis Date    ADHD     Ectopic pregnancy 2020    Migraines        SURGICAL HISTORY           Procedure Laterality Date    BREAST LUMPECTOMY Right     BREAST SURGERY Left 10/28/2020    LEFT BREAST MASS WIDE EXCISION W ULTRASOUND BY DR performed by Andrew Castaneda MD at [address] [place] [address] 2020    right tube removed    LYMPH NODE DISSECTION Left        FAMILY HISTORY     No family history on file. No family status information on file. SOCIAL HISTORY      reports that she has been smoking. She has been smoking an average of .25 packs per day. She has never used smokeless tobacco. She reports current alcohol use. She reports that she does not currently use drugs. REVIEW OF SYSTEMS    (2-9 systems for level 4, 10 or more for level 5)     Review of Systems   Constitutional:  Negative for chills and fever. Respiratory:  Negative for shortness of breath. Cardiovascular:  Negative for chest pain. Gastrointestinal:  Negative for nausea and vomiting. Skin:         Pain to left shoulder blade   Allergic/Immunologic: Negative for immunocompromised state. Neurological:  Negative for weakness, numbness and headaches. All other systems reviewed and are negative. PHYSICAL EXAM    (up to 7 for level 4, 8 or more for level 5)     ED Triage Vitals [09/06/22 2003]   BP Temp Temp Source Heart Rate Resp SpO2 Height Weight   113/70 98 °F (36.7 °C) Oral 93 16 99 % 5' 2\" (1.575 m) 125 lb (56.7 kg)       Physical Exam  Vitals and nursing note reviewed. Constitutional:       General: She is not in acute distress. Appearance: She is well-developed. HENT:      Head: Normocephalic and atraumatic. Nose: Nose normal.      Mouth/Throat:      Mouth: Mucous membranes are moist.   Eyes:      Extraocular Movements: Extraocular movements intact. Cardiovascular:      Rate and Rhythm: Normal rate and regular rhythm. Heart sounds: Normal heart sounds. Pulmonary:      Effort: Pulmonary effort is normal. No respiratory distress. Breath sounds: Normal breath sounds. Abdominal:      Palpations: Abdomen is soft. Tenderness: There is no abdominal tenderness. There is no guarding. Musculoskeletal:         General: No tenderness or deformity. Normal range of motion.       Cervical back: Normal range of motion and neck supple. Lymphadenopathy:      Cervical: No cervical adenopathy. Skin:     General: Skin is warm and dry. Findings: No rash. Neurological:      Mental Status: She is alert and oriented to person, place, and time. Psychiatric:         Behavior: Behavior normal.         Thought Content: Thought content normal.         Judgment: Judgment normal.       DIAGNOSTIC RESULTS     EKG:All EKG's are interpreted by the Emergency Department Physician who either signs or Co-signs this chart in the absence of a cardiologist.    Interpretation: Normal sinus rhythm with a rate of 89. Normal Axis. Normal intervals. No ST elevations. RADIOLOGY:   Non-plain film images such as CT, Ultrasound and MRI are read by theradiologist. Plain radiographic images are visualized and preliminarily interpreted by the emergency physician with the below findings:    Interpretation per the Radiologist below, if available at the time of this note:    XR CHEST PORTABLE   Final Result   No acute cardiopulmonary abnormality. ED BEDSIDE ULTRASOUND:   Performed by ED Physician - none    LABS:  Labs Reviewed   TROP/MYOGLOBIN - Abnormal; Notable for the following components:       Result Value    Myoglobin <21 (*)     All other components within normal limits   CBC WITH AUTO DIFFERENTIAL   BASIC METABOLIC PANEL W/ REFLEX TO MG FOR LOW K   D-DIMER, QUANTITATIVE   HEPATIC FUNCTION PANEL       All other labs were within normal range or not returned as of this dictation. EMERGENCYDEPARTMENT COURSE and DIFFERENTIAL DIAGNOSIS/MDM:   Vitals:    Vitals:    09/06/22 2003   BP: 113/70   Pulse: 93   Resp: 16   Temp: 98 °F (36.7 °C)   TempSrc: Oral   SpO2: 99%   Weight: 125 lb (56.7 kg)   Height: 5' 2\" (1.575 m)     51-year-old female presenting with pain to the left shoulder blade concerning for pulmonary pathology versus referred pain from the diaphragm. Chest x-ray is unremarkable.   She is found to have a negative D-dimer making PE unlikely. Pain was treated with a dose of Toradol and morphine. Unclear cause of her pain though given the lack of any trauma and normal x-ray, I feel she is safe for discharge home. Will provide a prescription for pain medicine to use as needed and recommend follow-up with her primary care doctor. CONSULTS:  None    PROCEDURES:  None indicated    FINAL IMPRESSION     1. Acute pain of left shoulder          DISPOSITION/PLAN   DISPOSITION Decision To Discharge 09/07/2022 12:57:40 AM    PATIENT REFERRED TO:   Nicol Hall MD  [address] [place] [address]  [phone]    Call in 1 day  For Follow up  [address]:     Discharge Medication List as of 9/7/2022 12:59 AM        START taking these medications    Details   HYDROcodone-acetaminophen (NORCO) 5-325 MG per tablet Take 1 tablet by mouth every 6 hours as needed for Pain for up to 5 days. Intended supply: 3 days.  Take lowest dose possible to manage pain, Disp-8 tablet, R-0Normal           (Please note that portions of this note were completed with a voice recognition program.  Efforts were made to edit the dictations butoccasionally words are mis-transcribed.)    Marylou Albert MD  Attending Emergency Physician         Marylou Albert MD  09/08/22 [id number]

## 2022-09-08 ENCOUNTER — PATIENT MESSAGE (OUTPATIENT)
Dept: OBGYN CLINIC | Age: 32
End: 2022-09-08

## 2022-09-08 DIAGNOSIS — R79.89 ELEVATED TSH: Primary | ICD-10-CM

## 2022-09-08 ASSESSMENT — ENCOUNTER SYMPTOMS
NAUSEA: 0
VOMITING: 0
SHORTNESS OF BREATH: 0

## 2022-09-09 NOTE — TELEPHONE ENCOUNTER
From: Manpreet Rojas  To: Dr. Anne Brothers: 9/8/2022 4:30 PM EDT  Subject: Labs    Hi,  Im starting another round of IVF soon and I havent been taking my synthroid. I was wonder if I could have my blood drawn for my TSH so I know where Im at with that. Thank you!

## 2022-11-16 ENCOUNTER — HOSPITAL ENCOUNTER (OUTPATIENT)
Age: 32
Discharge: HOME | End: 2022-11-16
Payer: OTHER GOVERNMENT

## 2022-11-16 LAB — HCG QUALITATIVE: NEGATIVE

## 2022-11-16 PROCEDURE — 84703 CHORIONIC GONADOTROPIN ASSAY: CPT

## 2022-11-16 PROCEDURE — 36415 COLL VENOUS BLD VENIPUNCTURE: CPT

## 2024-11-11 ENCOUNTER — HOSPITAL ENCOUNTER (OUTPATIENT)
Age: 34
Discharge: HOME | End: 2024-11-11
Payer: OTHER GOVERNMENT

## 2024-11-11 LAB
25(OH)D3 SERPL-MCNC: 25.1 NG/ML (ref 30–100)
ABO + RH BLD: NORMAL
BLOOD GROUP ANTIBODIES SERPL: NEGATIVE
EST. AVERAGE GLUCOSE BLD GHB EST-MCNC: 82 MG/DL
HBA1C MFR BLD: 4.5 % (ref 4–6)
HBV CORE AB SER QL: NONREACTIVE
HBV SURFACE AG SERPL QL IA: NONREACTIVE
HCV AB SERPL QL IA: NONREACTIVE
HISTORY CHECK: NORMAL
HIV 1+2 AB+HIV1 P24 AG SERPL QL IA: NONREACTIVE
PROLACTIN SERPL-MCNC: 14.5 NG/ML (ref 4.79–23.3)
RUBV IGG SERPL QL IA: 75.3 IU/ML
T PALLIDUM AB SER QL IA: NONREACTIVE
TSH SERPL DL<=0.05 MIU/L-ACNC: 1.65 UIU/ML (ref 0.27–4.2)

## 2024-11-11 PROCEDURE — 86780 TREPONEMA PALLIDUM: CPT

## 2024-11-11 PROCEDURE — 86787 VARICELLA-ZOSTER ANTIBODY: CPT

## 2024-11-11 PROCEDURE — 82306 VITAMIN D 25 HYDROXY: CPT

## 2024-11-11 PROCEDURE — 86762 RUBELLA ANTIBODY: CPT

## 2024-11-11 PROCEDURE — 84146 ASSAY OF PROLACTIN: CPT

## 2024-11-11 PROCEDURE — 86850 RBC ANTIBODY SCREEN: CPT

## 2024-11-11 PROCEDURE — 87340 HEPATITIS B SURFACE AG IA: CPT

## 2024-11-11 PROCEDURE — 87491 CHLMYD TRACH DNA AMP PROBE: CPT

## 2024-11-11 PROCEDURE — 81240 F2 GENE: CPT

## 2024-11-11 PROCEDURE — 86704 HEP B CORE ANTIBODY TOTAL: CPT

## 2024-11-11 PROCEDURE — 87591 N.GONORRHOEAE DNA AMP PROB: CPT

## 2024-11-11 PROCEDURE — 86901 BLOOD TYPING SEROLOGIC RH(D): CPT

## 2024-11-11 PROCEDURE — 84443 ASSAY THYROID STIM HORMONE: CPT

## 2024-11-11 PROCEDURE — 87389 HIV-1 AG W/HIV-1&-2 AB AG IA: CPT

## 2024-11-11 PROCEDURE — 82166 ASSAY ANTI-MULLERIAN HORM: CPT

## 2024-11-11 PROCEDURE — 81241 F5 GENE: CPT

## 2024-11-11 PROCEDURE — 86803 HEPATITIS C AB TEST: CPT

## 2024-11-11 PROCEDURE — 36415 COLL VENOUS BLD VENIPUNCTURE: CPT

## 2024-11-11 PROCEDURE — 83036 HEMOGLOBIN GLYCOSYLATED A1C: CPT

## 2024-11-11 PROCEDURE — 86900 BLOOD TYPING SEROLOGIC ABO: CPT

## 2024-11-11 PROCEDURE — 87522 HEPATITIS C REVRS TRNSCRPJ: CPT

## 2024-11-12 LAB
CHLAMYDIA DNA UR QL NAA+PROBE: NEGATIVE
N GONORRHOEA DNA UR QL NAA+PROBE: NEGATIVE
SPECIMEN DESCRIPTION: NORMAL

## 2024-11-13 LAB
HCV RNA # SERPL NAA+PROBE: NOT DETECTED {COPIES}/ML
SPECIMEN SOURCE: NORMAL
VZV IGG SER QL IA: 1.92

## 2024-11-14 LAB — ANTI-MULLERIAN HORMONE: 10.61 NG/ML (ref 0.18–11.71)

## 2024-11-15 LAB
F5 P.R506Q BLD/T QL: NEGATIVE
SPECIMEN SOURCE: NORMAL

## 2024-11-16 LAB
F2 C.20210G>A GENO BLD/T: ABNORMAL
SPECIMEN SOURCE: ABNORMAL

## (undated) DEVICE — Device

## (undated) DEVICE — Z DISCONTINUED PER MEDLINE DRESSING ALG W9XL10CM SIL CVR WTRPRF VIR BACT BARR ANTIMIC

## (undated) DEVICE — NEEDLE HYPO 25GA L1.5IN BLU POLYPR HUB S STL REG BVL STR

## (undated) DEVICE — GLOVE SURG SZ 75 L12IN FNGR THK79MIL GRN LTX FREE

## (undated) DEVICE — GEL US 20GM NONIRRITATING OVERWRAPPED FILE PCH TRNSMIT

## (undated) DEVICE — HYPODERMIC SAFETY NEEDLE: Brand: MAGELLAN

## (undated) DEVICE — Z DISCONTINUED USE 2624853 GLOVE SURG SZ 75 L12IN THK91MIL BRN LTX FREE

## (undated) DEVICE — SUTURE VCRL SZ 3-0 L54IN ABSRB UD LIGAPAK REEL POLYGLACTIN J285G

## (undated) DEVICE — SURGICAL PROCEDURE KIT BASIN MAJ SET UP

## (undated) DEVICE — SYRINGE, LUER LOCK, 10ML: Brand: MEDLINE

## (undated) DEVICE — SUTURE VCRL SZ 3-0 L27IN ABSRB UD L26MM SH 1/2 CIR J416H

## (undated) DEVICE — GARMENT,MEDLINE,DVT,INT,CALF,MED, GEN2: Brand: MEDLINE

## (undated) DEVICE — ABC HANDPIECE TRIPLE OPTION HANDPIECE: Brand: ABC

## (undated) DEVICE — APPLICATOR MEDICATED 26 CC SOLUTION HI LT ORNG CHLORAPREP

## (undated) DEVICE — PROBE COVER KIT: Brand: MEDLINE INDUSTRIES, INC.

## (undated) DEVICE — YANKAUER,BULB TIP,W/O VENT,RIGID,STERILE: Brand: MEDLINE

## (undated) DEVICE — YANKAUER,FLEXIBLE HANDLE,REGLR CAPACITY: Brand: MEDLINE INDUSTRIES, INC.

## (undated) DEVICE — BLANKET WRM W40.2XL55.9IN IORT LO BODY + MISTRAL AIR

## (undated) DEVICE — STOCKINETTE,DOUBLE PLY,6X48,STERILE: Brand: MEDLINE

## (undated) DEVICE — CONTAINER,SPECIMEN,OR STERILE,4OZ: Brand: MEDLINE

## (undated) DEVICE — GOWN,SIRUS,NON REINFRCD,LARGE,SET IN SL: Brand: MEDLINE

## (undated) DEVICE — ADHESIVE SKIN CLSR 0.7ML TOP DERMBND ADV

## (undated) DEVICE — DRAPE,REIN 53X77,STERILE: Brand: MEDLINE